# Patient Record
Sex: MALE | Race: WHITE | NOT HISPANIC OR LATINO | URBAN - METROPOLITAN AREA
[De-identification: names, ages, dates, MRNs, and addresses within clinical notes are randomized per-mention and may not be internally consistent; named-entity substitution may affect disease eponyms.]

---

## 2017-11-03 ENCOUNTER — EMERGENCY (EMERGENCY)
Facility: HOSPITAL | Age: 77
LOS: 1 days | Discharge: HOME | End: 2017-11-03
Attending: EMERGENCY MEDICINE | Admitting: EMERGENCY MEDICINE
Payer: MEDICARE

## 2017-11-03 VITALS
OXYGEN SATURATION: 97 % | HEIGHT: 70 IN | HEART RATE: 80 BPM | SYSTOLIC BLOOD PRESSURE: 118 MMHG | DIASTOLIC BLOOD PRESSURE: 69 MMHG | WEIGHT: 182.98 LBS | TEMPERATURE: 99 F | RESPIRATION RATE: 16 BRPM

## 2017-11-03 DIAGNOSIS — Z98.890 OTHER SPECIFIED POSTPROCEDURAL STATES: Chronic | ICD-10-CM

## 2017-11-03 LAB
ALBUMIN SERPL ELPH-MCNC: 3.7 G/DL — SIGNIFICANT CHANGE UP (ref 3.3–5)
ALP SERPL-CCNC: 55 U/L — SIGNIFICANT CHANGE UP (ref 40–120)
ALT FLD-CCNC: 16 U/L — SIGNIFICANT CHANGE UP (ref 12–78)
ANION GAP SERPL CALC-SCNC: 9 MMOL/L — SIGNIFICANT CHANGE UP (ref 5–17)
ANISOCYTOSIS BLD QL: SLIGHT — SIGNIFICANT CHANGE UP
AST SERPL-CCNC: 12 U/L — LOW (ref 15–37)
BILIRUB SERPL-MCNC: 1.1 MG/DL — SIGNIFICANT CHANGE UP (ref 0.2–1.2)
BUN SERPL-MCNC: 10 MG/DL — SIGNIFICANT CHANGE UP (ref 7–23)
CALCIUM SERPL-MCNC: 8.4 MG/DL — LOW (ref 8.5–10.1)
CHLORIDE SERPL-SCNC: 106 MMOL/L — SIGNIFICANT CHANGE UP (ref 96–108)
CK SERPL-CCNC: 165 U/L — SIGNIFICANT CHANGE UP (ref 26–308)
CO2 SERPL-SCNC: 26 MMOL/L — SIGNIFICANT CHANGE UP (ref 22–31)
CREAT SERPL-MCNC: 1.1 MG/DL — SIGNIFICANT CHANGE UP (ref 0.5–1.3)
ELLIPTOCYTES BLD QL SMEAR: SLIGHT — SIGNIFICANT CHANGE UP
GLUCOSE SERPL-MCNC: 127 MG/DL — HIGH (ref 70–99)
HCT VFR BLD CALC: 32 % — LOW (ref 39–50)
HGB BLD-MCNC: 10.8 G/DL — LOW (ref 13–17)
INR BLD: 1.19 RATIO — HIGH (ref 0.88–1.16)
LYMPHOCYTES # BLD AUTO: 31 % — SIGNIFICANT CHANGE UP (ref 13–44)
MACROCYTES BLD QL: SLIGHT — SIGNIFICANT CHANGE UP
MCHC RBC-ENTMCNC: 30.8 PG — SIGNIFICANT CHANGE UP (ref 27–34)
MCHC RBC-ENTMCNC: 33.7 GM/DL — SIGNIFICANT CHANGE UP (ref 32–36)
MCV RBC AUTO: 91.3 FL — SIGNIFICANT CHANGE UP (ref 80–100)
MONOCYTES NFR BLD AUTO: 22 % — HIGH (ref 1–9)
NEUTROPHILS NFR BLD AUTO: 43 % — SIGNIFICANT CHANGE UP (ref 43–77)
NEUTS BAND # BLD: 4 % — SIGNIFICANT CHANGE UP (ref 0–8)
PLAT MORPH BLD: NORMAL — SIGNIFICANT CHANGE UP
PLATELET # BLD AUTO: 185 K/UL — SIGNIFICANT CHANGE UP (ref 150–400)
POIKILOCYTOSIS BLD QL AUTO: SLIGHT — SIGNIFICANT CHANGE UP
POTASSIUM SERPL-MCNC: 4.1 MMOL/L — SIGNIFICANT CHANGE UP (ref 3.5–5.3)
POTASSIUM SERPL-SCNC: 4.1 MMOL/L — SIGNIFICANT CHANGE UP (ref 3.5–5.3)
PROT SERPL-MCNC: 7.7 G/DL — SIGNIFICANT CHANGE UP (ref 6–8.3)
PROTHROM AB SERPL-ACNC: 13 SEC — HIGH (ref 9.8–12.7)
RBC # BLD: 3.5 M/UL — LOW (ref 4.2–5.8)
RBC # FLD: 12.8 % — SIGNIFICANT CHANGE UP (ref 10.3–14.5)
RBC BLD AUTO: ABNORMAL
SODIUM SERPL-SCNC: 141 MMOL/L — SIGNIFICANT CHANGE UP (ref 135–145)
TROPONIN I SERPL-MCNC: <.015 NG/ML — SIGNIFICANT CHANGE UP (ref 0.01–0.04)
WBC # BLD: 4.1 K/UL — SIGNIFICANT CHANGE UP (ref 3.8–10.5)
WBC # FLD AUTO: 4.1 K/UL — SIGNIFICANT CHANGE UP (ref 3.8–10.5)

## 2017-11-03 PROCEDURE — 85027 COMPLETE CBC AUTOMATED: CPT

## 2017-11-03 PROCEDURE — 85610 PROTHROMBIN TIME: CPT

## 2017-11-03 PROCEDURE — 96360 HYDRATION IV INFUSION INIT: CPT

## 2017-11-03 PROCEDURE — 71045 X-RAY EXAM CHEST 1 VIEW: CPT

## 2017-11-03 PROCEDURE — 82550 ASSAY OF CK (CPK): CPT

## 2017-11-03 PROCEDURE — 71010: CPT | Mod: 26

## 2017-11-03 PROCEDURE — 84484 ASSAY OF TROPONIN QUANT: CPT

## 2017-11-03 PROCEDURE — 99284 EMERGENCY DEPT VISIT MOD MDM: CPT | Mod: 25

## 2017-11-03 PROCEDURE — 99285 EMERGENCY DEPT VISIT HI MDM: CPT

## 2017-11-03 PROCEDURE — 80053 COMPREHEN METABOLIC PANEL: CPT

## 2017-11-03 PROCEDURE — 36415 COLL VENOUS BLD VENIPUNCTURE: CPT

## 2017-11-03 RX ORDER — SODIUM CHLORIDE 9 MG/ML
1000 INJECTION INTRAMUSCULAR; INTRAVENOUS; SUBCUTANEOUS ONCE
Qty: 0 | Refills: 0 | Status: COMPLETED | OUTPATIENT
Start: 2017-11-03 | End: 2017-11-03

## 2017-11-03 RX ADMIN — SODIUM CHLORIDE 1000 MILLILITER(S): 9 INJECTION INTRAMUSCULAR; INTRAVENOUS; SUBCUTANEOUS at 23:24

## 2017-11-03 NOTE — ED PROVIDER NOTE - PROGRESS NOTE DETAILS
patient feeling well, no chest pain, no dizziness, feels back to normal, has appointment to see his cardiologist next week, understands to return to ER if having chest pain, shortness of breath or worsening of symptoms

## 2017-11-03 NOTE — ED ADULT NURSE NOTE - CHPI ED SYMPTOMS NEG
no nausea/no vomiting/no fever/no weakness/no decreased eating/drinking/no chills/no numbness/no pain/no tingling

## 2017-11-03 NOTE — ED PROVIDER NOTE - OBJECTIVE STATEMENT
77 male presents to ER with grandson states at 9pm he ate a big meal, "two giant hot dogs and macaroni salad", was watching television and vision became blurry, feeling light headed and dizziness, patient took his blood pressure and it was 75/40 and then asked his grandson to take him to the hospital. Patient states he was recently on vacation in [place] last month and while there he states he felt chest pain, diaphoresis and dizziness, went to the hospital and was told he had an NSTEMI, patient states he was admitted for 3 days and did not get a cardiac catheterization, was discharged home, patient [facility] Jackson and had a recent nuclear stress test but does not know the results. Patient currently feeling better, denies chest pain.

## 2017-11-03 NOTE — ED ADULT NURSE NOTE - OBJECTIVE STATEMENT
@[id number] Received and assumed care of pt at this time aaox3 in no obvious or acute distress. pt is a77 y/o m presents to ed walk in c/o dizziness , reports checking BP at home and was low. denies chest pain, visual disturbances, sob, diaphoresis, syncope, fever, chills, palpitations, weakness/numbness/tingling or any other distress. seen and evaluated by dr. cavazos. iv established with labs collected & sent, ekg completed, placed on continuous cardiac & pulse oximetry monitoring, dispo pending, will continue to monitor

## 2017-11-04 VITALS
OXYGEN SATURATION: 98 % | DIASTOLIC BLOOD PRESSURE: 63 MMHG | TEMPERATURE: 99 F | RESPIRATION RATE: 18 BRPM | HEART RATE: 76 BPM | SYSTOLIC BLOOD PRESSURE: 102 MMHG

## 2017-11-04 NOTE — ED ADULT NURSE REASSESSMENT NOTE - NS ED NURSE REASSESS COMMENT FT1
1st set cardiac enzymes negative, presently denies dizziness or any complaint, dispo pending, will continue to monitor.

## 2020-11-19 ENCOUNTER — INPATIENT (INPATIENT)
Facility: HOSPITAL | Age: 80
LOS: 0 days | Discharge: ANOTHER HOSPITAL | DRG: 281 | End: 2020-11-20
Attending: HOSPITALIST | Admitting: STUDENT IN AN ORGANIZED HEALTH CARE EDUCATION/TRAINING PROGRAM
Payer: MEDICARE

## 2020-11-19 VITALS
RESPIRATION RATE: 20 BRPM | DIASTOLIC BLOOD PRESSURE: 84 MMHG | HEART RATE: 101 BPM | SYSTOLIC BLOOD PRESSURE: 182 MMHG | HEIGHT: 70 IN | WEIGHT: 179.9 LBS | TEMPERATURE: 97 F | OXYGEN SATURATION: 98 %

## 2020-11-19 DIAGNOSIS — Z98.890 OTHER SPECIFIED POSTPROCEDURAL STATES: Chronic | ICD-10-CM

## 2020-11-19 DIAGNOSIS — N17.9 ACUTE KIDNEY FAILURE, UNSPECIFIED: ICD-10-CM

## 2020-11-19 DIAGNOSIS — I24.9 ACUTE ISCHEMIC HEART DISEASE, UNSPECIFIED: ICD-10-CM

## 2020-11-19 DIAGNOSIS — N40.0 BENIGN PROSTATIC HYPERPLASIA WITHOUT LOWER URINARY TRACT SYMPTOMS: ICD-10-CM

## 2020-11-19 DIAGNOSIS — I20.8 OTHER FORMS OF ANGINA PECTORIS: ICD-10-CM

## 2020-11-19 DIAGNOSIS — E11.9 TYPE 2 DIABETES MELLITUS WITHOUT COMPLICATIONS: ICD-10-CM

## 2020-11-19 DIAGNOSIS — E78.5 HYPERLIPIDEMIA, UNSPECIFIED: ICD-10-CM

## 2020-11-19 DIAGNOSIS — Z29.9 ENCOUNTER FOR PROPHYLACTIC MEASURES, UNSPECIFIED: ICD-10-CM

## 2020-11-19 DIAGNOSIS — I10 ESSENTIAL (PRIMARY) HYPERTENSION: ICD-10-CM

## 2020-11-19 LAB
ALBUMIN SERPL ELPH-MCNC: 3.9 G/DL — SIGNIFICANT CHANGE UP (ref 3.3–5)
ALP SERPL-CCNC: 59 U/L — SIGNIFICANT CHANGE UP (ref 40–120)
ALT FLD-CCNC: 28 U/L — SIGNIFICANT CHANGE UP (ref 12–78)
ANION GAP SERPL CALC-SCNC: 7 MMOL/L — SIGNIFICANT CHANGE UP (ref 5–17)
APPEARANCE UR: CLEAR — SIGNIFICANT CHANGE UP
AST SERPL-CCNC: 19 U/L — SIGNIFICANT CHANGE UP (ref 15–37)
BACTERIA # UR AUTO: ABNORMAL
BASOPHILS # BLD AUTO: 0 K/UL — SIGNIFICANT CHANGE UP (ref 0–0.2)
BASOPHILS NFR BLD AUTO: 0 % — SIGNIFICANT CHANGE UP (ref 0–2)
BILIRUB SERPL-MCNC: 0.7 MG/DL — SIGNIFICANT CHANGE UP (ref 0.2–1.2)
BILIRUB UR-MCNC: NEGATIVE — SIGNIFICANT CHANGE UP
BUN SERPL-MCNC: 27 MG/DL — HIGH (ref 7–23)
CALCIUM SERPL-MCNC: 8.9 MG/DL — SIGNIFICANT CHANGE UP (ref 8.5–10.1)
CHLORIDE SERPL-SCNC: 112 MMOL/L — HIGH (ref 96–108)
CO2 SERPL-SCNC: 26 MMOL/L — SIGNIFICANT CHANGE UP (ref 22–31)
COLOR SPEC: YELLOW — SIGNIFICANT CHANGE UP
CREAT SERPL-MCNC: 1.4 MG/DL — HIGH (ref 0.5–1.3)
DIFF PNL FLD: NEGATIVE — SIGNIFICANT CHANGE UP
EOSINOPHIL # BLD AUTO: 0 K/UL — SIGNIFICANT CHANGE UP (ref 0–0.5)
EOSINOPHIL NFR BLD AUTO: 0 % — SIGNIFICANT CHANGE UP (ref 0–6)
EPI CELLS # UR: SIGNIFICANT CHANGE UP
GLUCOSE SERPL-MCNC: 193 MG/DL — HIGH (ref 70–99)
GLUCOSE UR QL: 100 MG/DL
HCT VFR BLD CALC: 34.3 % — LOW (ref 39–50)
HGB BLD-MCNC: 11.6 G/DL — LOW (ref 13–17)
KETONES UR-MCNC: NEGATIVE — SIGNIFICANT CHANGE UP
LEUKOCYTE ESTERASE UR-ACNC: ABNORMAL
LIDOCAIN IGE QN: 286 U/L — SIGNIFICANT CHANGE UP (ref 73–393)
LYMPHOCYTES # BLD AUTO: 0.69 K/UL — LOW (ref 1–3.3)
LYMPHOCYTES # BLD AUTO: 16 % — SIGNIFICANT CHANGE UP (ref 13–44)
MANUAL SMEAR VERIFICATION: SIGNIFICANT CHANGE UP
MCHC RBC-ENTMCNC: 31.8 PG — SIGNIFICANT CHANGE UP (ref 27–34)
MCHC RBC-ENTMCNC: 33.8 GM/DL — SIGNIFICANT CHANGE UP (ref 32–36)
MCV RBC AUTO: 94 FL — SIGNIFICANT CHANGE UP (ref 80–100)
MONOCYTES # BLD AUTO: 1.07 K/UL — HIGH (ref 0–0.9)
MONOCYTES NFR BLD AUTO: 25 % — HIGH (ref 2–14)
NEUTROPHILS # BLD AUTO: 2.49 K/UL — SIGNIFICANT CHANGE UP (ref 1.8–7.4)
NEUTROPHILS NFR BLD AUTO: 56 % — SIGNIFICANT CHANGE UP (ref 43–77)
NEUTS BAND # BLD: 2 % — SIGNIFICANT CHANGE UP (ref 0–8)
NITRITE UR-MCNC: NEGATIVE — SIGNIFICANT CHANGE UP
NRBC # BLD: 0 — SIGNIFICANT CHANGE UP
NRBC # BLD: SIGNIFICANT CHANGE UP /100 WBCS (ref 0–0)
NT-PROBNP SERPL-SCNC: 330 PG/ML — SIGNIFICANT CHANGE UP (ref 0–450)
PH UR: 6.5 — SIGNIFICANT CHANGE UP (ref 5–8)
PLAT MORPH BLD: NORMAL — SIGNIFICANT CHANGE UP
PLATELET # BLD AUTO: 182 K/UL — SIGNIFICANT CHANGE UP (ref 150–400)
POTASSIUM SERPL-MCNC: 4.5 MMOL/L — SIGNIFICANT CHANGE UP (ref 3.5–5.3)
POTASSIUM SERPL-SCNC: 4.5 MMOL/L — SIGNIFICANT CHANGE UP (ref 3.5–5.3)
PROT SERPL-MCNC: 7.8 G/DL — SIGNIFICANT CHANGE UP (ref 6–8.3)
PROT UR-MCNC: 15
RBC # BLD: 3.65 M/UL — LOW (ref 4.2–5.8)
RBC # FLD: 14.1 % — SIGNIFICANT CHANGE UP (ref 10.3–14.5)
RBC BLD AUTO: SIGNIFICANT CHANGE UP
RBC CASTS # UR COMP ASSIST: SIGNIFICANT CHANGE UP /HPF (ref 0–4)
SODIUM SERPL-SCNC: 145 MMOL/L — SIGNIFICANT CHANGE UP (ref 135–145)
SP GR SPEC: 1.01 — SIGNIFICANT CHANGE UP (ref 1.01–1.02)
TROPONIN I SERPL-MCNC: <.015 NG/ML — SIGNIFICANT CHANGE UP (ref 0.01–0.04)
UROBILINOGEN FLD QL: NEGATIVE — SIGNIFICANT CHANGE UP
VARIANT LYMPHS # BLD: 1 % — SIGNIFICANT CHANGE UP (ref 0–6)
WBC # BLD: 4.29 K/UL — SIGNIFICANT CHANGE UP (ref 3.8–10.5)
WBC # FLD AUTO: 4.29 K/UL — SIGNIFICANT CHANGE UP (ref 3.8–10.5)
WBC UR QL: SIGNIFICANT CHANGE UP

## 2020-11-19 PROCEDURE — 99223 1ST HOSP IP/OBS HIGH 75: CPT | Mod: GC,AI

## 2020-11-19 PROCEDURE — 99291 CRITICAL CARE FIRST HOUR: CPT

## 2020-11-19 PROCEDURE — 71045 X-RAY EXAM CHEST 1 VIEW: CPT | Mod: 26

## 2020-11-19 PROCEDURE — 93010 ELECTROCARDIOGRAM REPORT: CPT

## 2020-11-19 RX ORDER — ASPIRIN/CALCIUM CARB/MAGNESIUM 324 MG
162 TABLET ORAL ONCE
Refills: 0 | Status: COMPLETED | OUTPATIENT
Start: 2020-11-19 | End: 2020-11-19

## 2020-11-19 RX ORDER — ENOXAPARIN SODIUM 100 MG/ML
80 INJECTION SUBCUTANEOUS ONCE
Refills: 0 | Status: COMPLETED | OUTPATIENT
Start: 2020-11-19 | End: 2020-11-19

## 2020-11-19 RX ORDER — ATORVASTATIN CALCIUM 80 MG/1
40 TABLET, FILM COATED ORAL AT BEDTIME
Refills: 0 | Status: DISCONTINUED | OUTPATIENT
Start: 2020-11-19 | End: 2020-11-20

## 2020-11-19 RX ORDER — ATORVASTATIN CALCIUM 80 MG/1
40 TABLET, FILM COATED ORAL DAILY
Refills: 0 | Status: DISCONTINUED | OUTPATIENT
Start: 2020-11-19 | End: 2020-11-19

## 2020-11-19 RX ORDER — ENOXAPARIN SODIUM 100 MG/ML
80 INJECTION SUBCUTANEOUS ONCE
Refills: 0 | Status: DISCONTINUED | OUTPATIENT
Start: 2020-11-19 | End: 2020-11-19

## 2020-11-19 RX ORDER — TICAGRELOR 90 MG/1
180 TABLET ORAL ONCE
Refills: 0 | Status: COMPLETED | OUTPATIENT
Start: 2020-11-19 | End: 2020-11-20

## 2020-11-19 RX ORDER — ENOXAPARIN SODIUM 100 MG/ML
85 INJECTION SUBCUTANEOUS ONCE
Refills: 0 | Status: DISCONTINUED | OUTPATIENT
Start: 2020-11-19 | End: 2020-11-19

## 2020-11-19 RX ORDER — TAMSULOSIN HYDROCHLORIDE 0.4 MG/1
0.8 CAPSULE ORAL AT BEDTIME
Refills: 0 | Status: DISCONTINUED | OUTPATIENT
Start: 2020-11-19 | End: 2020-11-20

## 2020-11-19 RX ORDER — METOPROLOL TARTRATE 50 MG
25 TABLET ORAL ONCE
Refills: 0 | Status: COMPLETED | OUTPATIENT
Start: 2020-11-19 | End: 2020-11-20

## 2020-11-19 RX ORDER — ISOSORBIDE MONONITRATE 60 MG/1
1 TABLET, EXTENDED RELEASE ORAL
Qty: 0 | Refills: 0 | DISCHARGE

## 2020-11-19 RX ORDER — FINASTERIDE 5 MG/1
5 TABLET, FILM COATED ORAL DAILY
Refills: 0 | Status: DISCONTINUED | OUTPATIENT
Start: 2020-11-19 | End: 2020-11-20

## 2020-11-19 RX ORDER — ASPIRIN/CALCIUM CARB/MAGNESIUM 324 MG
81 TABLET ORAL DAILY
Refills: 0 | Status: DISCONTINUED | OUTPATIENT
Start: 2020-11-20 | End: 2020-11-20

## 2020-11-19 RX ORDER — METOPROLOL TARTRATE 50 MG
50 TABLET ORAL DAILY
Refills: 0 | Status: DISCONTINUED | OUTPATIENT
Start: 2020-11-20 | End: 2020-11-20

## 2020-11-19 RX ORDER — FAMOTIDINE 10 MG/ML
20 INJECTION INTRAVENOUS ONCE
Refills: 0 | Status: COMPLETED | OUTPATIENT
Start: 2020-11-19 | End: 2020-11-19

## 2020-11-19 RX ORDER — METOPROLOL TARTRATE 50 MG
0 TABLET ORAL
Qty: 0 | Refills: 0 | DISCHARGE

## 2020-11-19 RX ORDER — RANOLAZINE 500 MG/1
500 TABLET, FILM COATED, EXTENDED RELEASE ORAL
Refills: 0 | Status: DISCONTINUED | OUTPATIENT
Start: 2020-11-19 | End: 2020-11-20

## 2020-11-19 RX ORDER — ISOSORBIDE MONONITRATE 60 MG/1
15 TABLET, EXTENDED RELEASE ORAL DAILY
Refills: 0 | Status: DISCONTINUED | OUTPATIENT
Start: 2020-11-19 | End: 2020-11-20

## 2020-11-19 RX ADMIN — FAMOTIDINE 20 MILLIGRAM(S): 10 INJECTION INTRAVENOUS at 21:49

## 2020-11-19 RX ADMIN — Medication 162 MILLIGRAM(S): at 21:50

## 2020-11-19 NOTE — H&P ADULT - NSHPSOCIALHISTORY_GEN_ALL_CORE
Denies ETOH or illicit drug use  Former smoke, quit 50yrs ago  Ambulates independently w/o assistive device

## 2020-11-19 NOTE — H&P ADULT - NSICDXPASTMEDICALHX_GEN_ALL_CORE_FT
PAST MEDICAL HISTORY:  Diabetes     Hyperlipemia     Hypertension     MI (myocardial infarction) 10/17/2017

## 2020-11-19 NOTE — H&P ADULT - ASSESSMENT
81yo M with PMH of HTN, HLD, DM2, NSTEMI (2017), and BPH presents to the ED for chest pressure, admitted for r/o ACS

## 2020-11-19 NOTE — ED PROVIDER NOTE - OBJECTIVE STATEMENT
Mid-sternal chest pressure c diaphoresis about 1 hour ago, but a lot better in the ED, just very "slight" c sob after coming home from grocery shopping. No nausea, vomiting .  [facility] [place]. HO NSTEMI on Dec 2017.  No prior episodes.  No other complaints.

## 2020-11-19 NOTE — H&P ADULT - NSHPREVIEWOFSYSTEMS_GEN_ALL_CORE
Constitutional: denies fever, chills, diaphoresis   HEENT: denies blurry vision, double vision, eye pain, difficulty hearing  Respiratory: denies SOB, cough, sputum production  Cardiovascular: denies CP, palpitations, edema. Positive for chest pressure  Gastrointestinal: denies nausea, vomiting, diarrhea, constipation, abdominal pain  Genitourinary: denies dysuria, frequency, urgency, hematuria   Skin/Breast: denies rash, itching  Neurologic: denies headache, weakness, dizziness, paresthesias, numbness/tingling

## 2020-11-19 NOTE — ED ADULT NURSE NOTE - CHPI ED NUR SYMPTOMS NEG
no shortness of breath/no nausea/no back pain/no congestion/no dizziness/no syncope/no chills/no vomiting/no diaphoresis/no fever

## 2020-11-19 NOTE — H&P ADULT - PROBLEM SELECTOR PLAN 1
Patient presenting w/ chest pressure, now resolved  -hx of NSTEMI in 2017  -Initial ECG showed 1mm ST depressions in leads V4-6 and aVF, repeat ECG showed 2mm ST depressions in leads V4-5  -Trops neg x1  -Currently HD stable  -s/p  in the ED  -continue ASA 81 and atorvastatin 40mg  -f/u repeat CE  -Cardio, Dr. Paz consulted Patient presenting w/ chest pressure, now resolved  -hx of NSTEMI in 2017  -Initial ECG showed 1mm ST depressions in leads V4-6 and aVF, repeat ECG showed 2mm ST depressions in leads V4-5  -Trops neg x1  -Currently HD stable  -s/p  in the ED  -continue ASA 81 and atorvastatin 40mg  -f/u repeat CE at 3AM  -f/u lipid panel  -Cardio, Dr. Paz consulted Patient presenting w/ chest pressure, now resolved  -hx of NSTEMI in 2017  -Initial ECG showed 1mm ST depressions in leads V4-6 and aVF, repeat ECG showed 2mm ST depressions in leads V4-5  -Trops neg x1  -Currently HD stable and without chest pain  -s/p  in the ED  -continue ASA 81 and atorvastatin 40mg  - s/p lovenox 80mg x 1.   -f/u repeat CE at 3AM, if enzymes remain neg will convert from full a/c to ppx lovenox.   -f/u lipid panel  -Cardio, Dr. Paz consulted

## 2020-11-19 NOTE — ED PROVIDER NOTE - CRITICAL CARE PROVIDED
direct patient care (not related to procedure)/documentation/interpretation of diagnostic studies/consultation with other physicians/additional history taking/conducted a detailed discussion of DNR status

## 2020-11-19 NOTE — ED ADULT NURSE NOTE - NSIMPLEMENTINTERV_GEN_ALL_ED
Implemented All Universal Safety Interventions:  [place] to call system. Call bell, personal items and telephone within reach. Instruct patient to call for assistance. Room bathroom lighting operational. Non-slip footwear when patient is off stretcher. Physically safe environment: no spills, clutter or unnecessary equipment. Stretcher in lowest position, wheels locked, appropriate side rails in place.

## 2020-11-19 NOTE — ED ADULT NURSE NOTE - OBJECTIVE STATEMENT
81yo male c/o chest pain/pressure X1 hour prior to ED arrival. pt indicates "the pain lasted 1 hour" as per pt. pt denies dizziness, blur vision and pain at this time. pt indicates " pressure was located to center of chest.

## 2020-11-19 NOTE — H&P ADULT - PROBLEM SELECTOR PLAN 2
Cr 1.4 on admission, unknown baseline  -suspect prerenal azotemia  -monitor renal indices  -avoid nephrotoxic meds

## 2020-11-19 NOTE — H&P ADULT - NSHPPHYSICALEXAM_GEN_ALL_CORE
T(C): 36 (11-19-20 @ 21:05), Max: 36 (11-19-20 @ 21:05)  HR: 84 (11-19-20 @ 21:51) (84 - 101)  BP: 161/72 (11-19-20 @ 21:51) (161/72 - 182/84)  RR: 15 (11-19-20 @ 21:51) (15 - 20)  SpO2: 100% (11-19-20 @ 21:51) (98% - 100%)    GENERAL: patient appears well, no acute distress, appropriate, pleasant  EYES: sclera clear, no exudates, EOMI  ENMT: oropharynx clear without erythema, no exudates, moist mucous membranes  LUNGS: good air entry bilaterally, clear to auscultation, symmetric breath sounds, no wheezing or rhonchi appreciated  HEART: soft S1/S2, regular rate and rhythm, no murmurs noted, no lower extremity edema  GASTROINTESTINAL: abdomen is soft, nontender, nondistended, normoactive bowel sounds, no palpable masses  INTEGUMENT: good skin turgor, no lesions noted  MUSCULOSKELETAL: no clubbing or cyanosis, no obvious deformity  NEUROLOGIC: awake, alert, oriented x3, good muscle tone in 4 extremities, no obvious sensory deficits

## 2020-11-19 NOTE — H&P ADULT - HISTORY OF PRESENT ILLNESS
79yo M with PMH of HTN, HLD, DM2, NSTEMI (2017), and BPH presents to the ED for chest pressure. Starting at 8pm today patient began experiencing sudden onset midsternal chest pressure and mild sweating. He notes lifting heavy groceries earlier in the afternoon and since then has felt fine until his onset of symptoms. Hx of NSTEMI in the past, patient states that sx are not similar in quality. He ate a large amount of food at around 2pm and thought the pain was due to GERD and took an antacid and took his home isosorbide nitrate x2. Pain did not subside so he decided to go to the ED. Patient currently feels well, notes relief of chest pressure. Denies ever having chest pain or radiation. Denies any HA, dizziness, diaphoresis, CP, palpitations, SOB, n/v, abdominal pain, or LE swelling.    In the ED  VS- 96.8F, 101HR, 182/84, 20RR, O2 sat 98% on RA  Labs showed trop neg x1, BNP normal, lipase WNL, Hb 11.6, Cr 1.4, glucose 193  Initial ECG showed 1mm ST depressions in leads V4-6 and aVF, repeat ECG showed 2mm ST depressions in leads V4-5  s/p  and famotidine 20mg IV 81yo M with PMH of HTN, HLD, DM2, NSTEMI (2017), and BPH presents to the ED for chest pressure. Starting at 8pm today patient began experiencing sudden onset midsternal chest pressure and mild sweating. He notes lifting heavy groceries earlier in the afternoon and since then has felt fine until his onset of symptoms. Hx of NSTEMI in the past, patient states that sx are not similar in quality. He ate a large amount of food at around 2pm and thought the pain was due to GERD and took an antacid and took his home isosorbide nitrate x2. Pain did not subside so he decided to go to the ED. Patient currently feels well, notes relief of chest pressure. Denies ever having chest pain or radiation. Denies any HA, dizziness, diaphoresis, CP, palpitations, SOB, n/v, abdominal pain, or LE swelling.    In the ED  VS- 97.8F, 101HR, 182/84, 20RR, O2 sat 98% on RA  Labs showed trop neg x1, BNP normal, lipase WNL, Hb 11.6, Cr 1.4, glucose 193  Initial ECG showed 1mm ST depressions in leads V4-6 and aVF, repeat ECG showed 2mm ST depressions in leads V4-5  s/p  and famotidine 20mg IV 81yo M with PMH of HTN, HLD, DM2 non insulin dependent, NSTEMI (2017), and BPH presents to the ED for chest pressure. Starting at 8pm today patient began experiencing sudden onset midsternal chest pressure and mild sweating. He notes lifting heavy groceries earlier in the afternoon and since then has felt fine until his onset of symptoms. Hx of NSTEMI in the past, patient states that sx are not similar in quality. He ate a large amount of food at around 2pm and thought the pain was due to GERD and took an antacid and took his home isosorbide nitrate x2. Pain did not subside so he decided to go to the ED. Patient currently feels well, notes relief of chest pressure. Denies ever having chest pain or radiation. Denies any HA, dizziness, diaphoresis, CP, palpitations, SOB, n/v, abdominal pain, or LE swelling.  of note pt exercises multiple times a week , both strength and cardio exercises . He never has symptoms of cp or sob with exertion.      In the ED  VS- 97.8F, 101HR, 182/84, 20RR, O2 sat 98% on RA  Labs showed trop neg x1, BNP normal, lipase WNL, Hb 11.6, Cr 1.4, glucose 193  Initial ECG showed 1mm ST depressions in leads V4-6 and aVF, repeat ECG showed 2mm ST depressions in leads V4-5  s/p  and famotidine 20mg IV

## 2020-11-20 ENCOUNTER — INPATIENT (INPATIENT)
Facility: HOSPITAL | Age: 80
LOS: 3 days | Discharge: HOME | DRG: 247 | End: 2020-11-24
Attending: HOSPITALIST | Admitting: INTERNAL MEDICINE
Payer: MEDICARE

## 2020-11-20 ENCOUNTER — TRANSCRIPTION ENCOUNTER (OUTPATIENT)
Age: 80
End: 2020-11-20

## 2020-11-20 VITALS
SYSTOLIC BLOOD PRESSURE: 156 MMHG | OXYGEN SATURATION: 97 % | HEART RATE: 73 BPM | TEMPERATURE: 98 F | DIASTOLIC BLOOD PRESSURE: 78 MMHG | HEIGHT: 70 IN | RESPIRATION RATE: 18 BRPM | WEIGHT: 179.9 LBS

## 2020-11-20 VITALS — SYSTOLIC BLOOD PRESSURE: 147 MMHG | HEART RATE: 66 BPM | DIASTOLIC BLOOD PRESSURE: 72 MMHG | RESPIRATION RATE: 18 BRPM

## 2020-11-20 DIAGNOSIS — I21.4 NON-ST ELEVATION (NSTEMI) MYOCARDIAL INFARCTION: ICD-10-CM

## 2020-11-20 DIAGNOSIS — E78.5 HYPERLIPIDEMIA, UNSPECIFIED: ICD-10-CM

## 2020-11-20 DIAGNOSIS — Z90.89 ACQUIRED ABSENCE OF OTHER ORGANS: Chronic | ICD-10-CM

## 2020-11-20 DIAGNOSIS — R07.9 CHEST PAIN, UNSPECIFIED: ICD-10-CM

## 2020-11-20 DIAGNOSIS — E11.9 TYPE 2 DIABETES MELLITUS WITHOUT COMPLICATIONS: ICD-10-CM

## 2020-11-20 DIAGNOSIS — N17.9 ACUTE KIDNEY FAILURE, UNSPECIFIED: ICD-10-CM

## 2020-11-20 DIAGNOSIS — Z98.890 OTHER SPECIFIED POSTPROCEDURAL STATES: Chronic | ICD-10-CM

## 2020-11-20 DIAGNOSIS — N40.0 BENIGN PROSTATIC HYPERPLASIA WITHOUT LOWER URINARY TRACT SYMPTOMS: ICD-10-CM

## 2020-11-20 DIAGNOSIS — I10 ESSENTIAL (PRIMARY) HYPERTENSION: ICD-10-CM

## 2020-11-20 DIAGNOSIS — Z29.9 ENCOUNTER FOR PROPHYLACTIC MEASURES, UNSPECIFIED: ICD-10-CM

## 2020-11-20 PROBLEM — I21.9 ACUTE MYOCARDIAL INFARCTION, UNSPECIFIED: Chronic | Status: ACTIVE | Noted: 2017-11-03

## 2020-11-20 LAB
A1C WITH ESTIMATED AVERAGE GLUCOSE RESULT: 5.7 % — HIGH (ref 4–5.6)
ANION GAP SERPL CALC-SCNC: 6 MMOL/L — SIGNIFICANT CHANGE UP (ref 5–17)
BUN SERPL-MCNC: 23 MG/DL — SIGNIFICANT CHANGE UP (ref 7–23)
CALCIUM SERPL-MCNC: 9.1 MG/DL — SIGNIFICANT CHANGE UP (ref 8.5–10.1)
CHLORIDE SERPL-SCNC: 110 MMOL/L — HIGH (ref 96–108)
CHOLEST SERPL-MCNC: 104 MG/DL — SIGNIFICANT CHANGE UP
CK MB BLD-MCNC: 6.8 % — HIGH (ref 0–3.5)
CK MB BLD-MCNC: 6.9 % — HIGH (ref 0–3.5)
CK MB CFR SERPL CALC: 12.7 NG/ML — HIGH (ref 0–3.6)
CK MB CFR SERPL CALC: 16.8 NG/ML — HIGH (ref 0–3.6)
CK SERPL-CCNC: 188 U/L — SIGNIFICANT CHANGE UP (ref 26–308)
CK SERPL-CCNC: 245 U/L — SIGNIFICANT CHANGE UP (ref 26–308)
CO2 SERPL-SCNC: 28 MMOL/L — SIGNIFICANT CHANGE UP (ref 22–31)
CREAT ?TM UR-MCNC: 74 MG/DL — SIGNIFICANT CHANGE UP
CREAT SERPL-MCNC: 1.2 MG/DL — SIGNIFICANT CHANGE UP (ref 0.5–1.3)
ESTIMATED AVERAGE GLUCOSE: 117 MG/DL — HIGH (ref 68–114)
GLUCOSE BLDC GLUCOMTR-MCNC: 114 MG/DL — HIGH (ref 70–99)
GLUCOSE BLDC GLUCOMTR-MCNC: 116 MG/DL — HIGH (ref 70–99)
GLUCOSE BLDC GLUCOMTR-MCNC: 145 MG/DL — HIGH (ref 70–99)
GLUCOSE SERPL-MCNC: 98 MG/DL — SIGNIFICANT CHANGE UP (ref 70–99)
HCT VFR BLD CALC: 34.1 % — LOW (ref 39–50)
HDLC SERPL-MCNC: 44 MG/DL — SIGNIFICANT CHANGE UP
HGB BLD-MCNC: 11.4 G/DL — LOW (ref 13–17)
LIPID PNL WITH DIRECT LDL SERPL: 47 MG/DL — SIGNIFICANT CHANGE UP
MCHC RBC-ENTMCNC: 31.4 PG — SIGNIFICANT CHANGE UP (ref 27–34)
MCHC RBC-ENTMCNC: 33.4 GM/DL — SIGNIFICANT CHANGE UP (ref 32–36)
MCV RBC AUTO: 93.9 FL — SIGNIFICANT CHANGE UP (ref 80–100)
NON HDL CHOLESTEROL: 60 MG/DL — SIGNIFICANT CHANGE UP
NRBC # BLD: 0 /100 WBCS — SIGNIFICANT CHANGE UP (ref 0–0)
PLATELET # BLD AUTO: 165 K/UL — SIGNIFICANT CHANGE UP (ref 150–400)
POTASSIUM SERPL-MCNC: 4.4 MMOL/L — SIGNIFICANT CHANGE UP (ref 3.5–5.3)
POTASSIUM SERPL-SCNC: 4.4 MMOL/L — SIGNIFICANT CHANGE UP (ref 3.5–5.3)
RBC # BLD: 3.63 M/UL — LOW (ref 4.2–5.8)
RBC # FLD: 13.9 % — SIGNIFICANT CHANGE UP (ref 10.3–14.5)
SARS-COV-2 RNA SPEC QL NAA+PROBE: SIGNIFICANT CHANGE UP
SODIUM SERPL-SCNC: 144 MMOL/L — SIGNIFICANT CHANGE UP (ref 135–145)
SODIUM UR-SCNC: 145 MMOL/L — SIGNIFICANT CHANGE UP
TRIGL SERPL-MCNC: 66 MG/DL — SIGNIFICANT CHANGE UP
TROPONIN I SERPL-MCNC: 1.61 NG/ML — HIGH (ref 0.01–0.04)
TROPONIN I SERPL-MCNC: 2.85 NG/ML — HIGH (ref 0.01–0.04)
WBC # BLD: 5.24 K/UL — SIGNIFICANT CHANGE UP (ref 3.8–10.5)
WBC # FLD AUTO: 5.24 K/UL — SIGNIFICANT CHANGE UP (ref 3.8–10.5)

## 2020-11-20 PROCEDURE — 93010 ELECTROCARDIOGRAM REPORT: CPT | Mod: 76

## 2020-11-20 PROCEDURE — 92941 PRQ TRLML REVSC TOT OCCL AMI: CPT | Mod: RC

## 2020-11-20 PROCEDURE — 99222 1ST HOSP IP/OBS MODERATE 55: CPT

## 2020-11-20 PROCEDURE — 99152 MOD SED SAME PHYS/QHP 5/>YRS: CPT

## 2020-11-20 PROCEDURE — 93010 ELECTROCARDIOGRAM REPORT: CPT

## 2020-11-20 PROCEDURE — 93458 L HRT ARTERY/VENTRICLE ANGIO: CPT | Mod: 26,59

## 2020-11-20 PROCEDURE — 99223 1ST HOSP IP/OBS HIGH 75: CPT

## 2020-11-20 RX ORDER — SODIUM CHLORIDE 9 MG/ML
1000 INJECTION, SOLUTION INTRAVENOUS
Refills: 0 | Status: DISCONTINUED | OUTPATIENT
Start: 2020-11-20 | End: 2020-11-20

## 2020-11-20 RX ORDER — TAMSULOSIN HYDROCHLORIDE 0.4 MG/1
0.8 CAPSULE ORAL AT BEDTIME
Refills: 0 | Status: DISCONTINUED | OUTPATIENT
Start: 2020-11-20 | End: 2020-11-24

## 2020-11-20 RX ORDER — METFORMIN HYDROCHLORIDE 850 MG/1
1 TABLET ORAL
Qty: 0 | Refills: 0 | DISCHARGE

## 2020-11-20 RX ORDER — RANOLAZINE 500 MG/1
500 TABLET, FILM COATED, EXTENDED RELEASE ORAL
Refills: 0 | Status: DISCONTINUED | OUTPATIENT
Start: 2020-11-20 | End: 2020-11-24

## 2020-11-20 RX ORDER — ASPIRIN/CALCIUM CARB/MAGNESIUM 324 MG
81 TABLET ORAL DAILY
Refills: 0 | Status: DISCONTINUED | OUTPATIENT
Start: 2020-11-20 | End: 2020-11-24

## 2020-11-20 RX ORDER — LANOLIN ALCOHOL/MO/W.PET/CERES
1 CREAM (GRAM) TOPICAL
Qty: 0 | Refills: 0 | DISCHARGE

## 2020-11-20 RX ORDER — INSULIN LISPRO 100/ML
VIAL (ML) SUBCUTANEOUS
Refills: 0 | Status: DISCONTINUED | OUTPATIENT
Start: 2020-11-20 | End: 2020-11-24

## 2020-11-20 RX ORDER — ATORVASTATIN CALCIUM 80 MG/1
1 TABLET, FILM COATED ORAL
Qty: 0 | Refills: 0 | DISCHARGE

## 2020-11-20 RX ORDER — INSULIN LISPRO 100/ML
1 VIAL (ML) SUBCUTANEOUS
Qty: 0 | Refills: 0 | DISCHARGE
Start: 2020-11-20

## 2020-11-20 RX ORDER — GLUCAGON INJECTION, SOLUTION 0.5 MG/.1ML
1 INJECTION, SOLUTION SUBCUTANEOUS ONCE
Refills: 0 | Status: DISCONTINUED | OUTPATIENT
Start: 2020-11-20 | End: 2020-11-20

## 2020-11-20 RX ORDER — DEXTROSE 50 % IN WATER 50 %
15 SYRINGE (ML) INTRAVENOUS ONCE
Refills: 0 | Status: DISCONTINUED | OUTPATIENT
Start: 2020-11-20 | End: 2020-11-24

## 2020-11-20 RX ORDER — SODIUM CHLORIDE 9 MG/ML
1000 INJECTION, SOLUTION INTRAVENOUS
Refills: 0 | Status: DISCONTINUED | OUTPATIENT
Start: 2020-11-20 | End: 2020-11-21

## 2020-11-20 RX ORDER — DEXTROSE 50 % IN WATER 50 %
12.5 SYRINGE (ML) INTRAVENOUS ONCE
Refills: 0 | Status: DISCONTINUED | OUTPATIENT
Start: 2020-11-20 | End: 2020-11-20

## 2020-11-20 RX ORDER — SODIUM CHLORIDE 9 MG/ML
3 INJECTION INTRAMUSCULAR; INTRAVENOUS; SUBCUTANEOUS EVERY 8 HOURS
Refills: 0 | Status: DISCONTINUED | OUTPATIENT
Start: 2020-11-20 | End: 2020-11-21

## 2020-11-20 RX ORDER — DEXTROSE 50 % IN WATER 50 %
12.5 SYRINGE (ML) INTRAVENOUS ONCE
Refills: 0 | Status: DISCONTINUED | OUTPATIENT
Start: 2020-11-20 | End: 2020-11-24

## 2020-11-20 RX ORDER — INSULIN LISPRO 100/ML
VIAL (ML) SUBCUTANEOUS AT BEDTIME
Refills: 0 | Status: DISCONTINUED | OUTPATIENT
Start: 2020-11-19 | End: 2020-11-20

## 2020-11-20 RX ORDER — RANOLAZINE 500 MG/1
1 TABLET, FILM COATED, EXTENDED RELEASE ORAL
Qty: 0 | Refills: 0 | DISCHARGE

## 2020-11-20 RX ORDER — DEXTROSE 50 % IN WATER 50 %
15 SYRINGE (ML) INTRAVENOUS ONCE
Refills: 0 | Status: DISCONTINUED | OUTPATIENT
Start: 2020-11-20 | End: 2020-11-20

## 2020-11-20 RX ORDER — DEXTROSE 50 % IN WATER 50 %
25 SYRINGE (ML) INTRAVENOUS ONCE
Refills: 0 | Status: DISCONTINUED | OUTPATIENT
Start: 2020-11-20 | End: 2020-11-20

## 2020-11-20 RX ORDER — SODIUM CHLORIDE 9 MG/ML
1000 INJECTION INTRAMUSCULAR; INTRAVENOUS; SUBCUTANEOUS
Refills: 0 | Status: DISCONTINUED | OUTPATIENT
Start: 2020-11-20 | End: 2020-11-21

## 2020-11-20 RX ORDER — TAMSULOSIN HYDROCHLORIDE 0.4 MG/1
2 CAPSULE ORAL
Qty: 0 | Refills: 0 | DISCHARGE

## 2020-11-20 RX ORDER — ISOSORBIDE MONONITRATE 60 MG/1
0.5 TABLET, EXTENDED RELEASE ORAL
Qty: 0 | Refills: 0 | DISCHARGE

## 2020-11-20 RX ORDER — ENOXAPARIN SODIUM 100 MG/ML
80 INJECTION SUBCUTANEOUS
Refills: 0 | Status: DISCONTINUED | OUTPATIENT
Start: 2020-11-20 | End: 2020-11-20

## 2020-11-20 RX ORDER — DEXTROSE 50 % IN WATER 50 %
25 SYRINGE (ML) INTRAVENOUS ONCE
Refills: 0 | Status: DISCONTINUED | OUTPATIENT
Start: 2020-11-20 | End: 2020-11-24

## 2020-11-20 RX ORDER — INSULIN LISPRO 100/ML
0 VIAL (ML) SUBCUTANEOUS
Qty: 0 | Refills: 0 | DISCHARGE
Start: 2020-11-20

## 2020-11-20 RX ORDER — LANOLIN ALCOHOL/MO/W.PET/CERES
5 CREAM (GRAM) TOPICAL AT BEDTIME
Refills: 0 | Status: DISCONTINUED | OUTPATIENT
Start: 2020-11-20 | End: 2020-11-24

## 2020-11-20 RX ORDER — INSULIN LISPRO 100/ML
VIAL (ML) SUBCUTANEOUS AT BEDTIME
Refills: 0 | Status: DISCONTINUED | OUTPATIENT
Start: 2020-11-20 | End: 2020-11-24

## 2020-11-20 RX ORDER — INSULIN LISPRO 100/ML
VIAL (ML) SUBCUTANEOUS
Refills: 0 | Status: DISCONTINUED | OUTPATIENT
Start: 2020-11-19 | End: 2020-11-20

## 2020-11-20 RX ORDER — FINASTERIDE 5 MG/1
5 TABLET, FILM COATED ORAL DAILY
Refills: 0 | Status: DISCONTINUED | OUTPATIENT
Start: 2020-11-20 | End: 2020-11-24

## 2020-11-20 RX ORDER — FINASTERIDE 5 MG/1
1 TABLET, FILM COATED ORAL
Qty: 0 | Refills: 0 | DISCHARGE

## 2020-11-20 RX ORDER — METOPROLOL TARTRATE 50 MG
1 TABLET ORAL
Qty: 0 | Refills: 0 | DISCHARGE

## 2020-11-20 RX ORDER — TICAGRELOR 90 MG/1
180 TABLET ORAL ONCE
Refills: 0 | Status: COMPLETED | OUTPATIENT
Start: 2020-11-20 | End: 2020-11-20

## 2020-11-20 RX ORDER — ISOSORBIDE MONONITRATE 60 MG/1
15 TABLET, EXTENDED RELEASE ORAL DAILY
Refills: 0 | Status: DISCONTINUED | OUTPATIENT
Start: 2020-11-20 | End: 2020-11-24

## 2020-11-20 RX ORDER — HEPARIN SODIUM 5000 [USP'U]/ML
5000 INJECTION INTRAVENOUS; SUBCUTANEOUS EVERY 12 HOURS
Refills: 0 | Status: DISCONTINUED | OUTPATIENT
Start: 2020-11-19 | End: 2020-11-20

## 2020-11-20 RX ORDER — METOPROLOL TARTRATE 50 MG
50 TABLET ORAL DAILY
Refills: 0 | Status: DISCONTINUED | OUTPATIENT
Start: 2020-11-20 | End: 2020-11-24

## 2020-11-20 RX ORDER — ATORVASTATIN CALCIUM 80 MG/1
40 TABLET, FILM COATED ORAL AT BEDTIME
Refills: 0 | Status: DISCONTINUED | OUTPATIENT
Start: 2020-11-20 | End: 2020-11-24

## 2020-11-20 RX ORDER — GLUCAGON INJECTION, SOLUTION 0.5 MG/.1ML
1 INJECTION, SOLUTION SUBCUTANEOUS ONCE
Refills: 0 | Status: DISCONTINUED | OUTPATIENT
Start: 2020-11-20 | End: 2020-11-21

## 2020-11-20 RX ORDER — TICAGRELOR 90 MG/1
90 TABLET ORAL EVERY 12 HOURS
Refills: 0 | Status: DISCONTINUED | OUTPATIENT
Start: 2020-11-21 | End: 2020-11-24

## 2020-11-20 RX ADMIN — SODIUM CHLORIDE 3 MILLILITER(S): 9 INJECTION INTRAMUSCULAR; INTRAVENOUS; SUBCUTANEOUS at 21:23

## 2020-11-20 RX ADMIN — ISOSORBIDE MONONITRATE 15 MILLIGRAM(S): 60 TABLET, EXTENDED RELEASE ORAL at 17:29

## 2020-11-20 RX ADMIN — ENOXAPARIN SODIUM 80 MILLIGRAM(S): 100 INJECTION SUBCUTANEOUS at 00:08

## 2020-11-20 RX ADMIN — Medication 25 MILLIGRAM(S): at 09:45

## 2020-11-20 RX ADMIN — TICAGRELOR 180 MILLIGRAM(S): 90 TABLET ORAL at 00:08

## 2020-11-20 RX ADMIN — ATORVASTATIN CALCIUM 40 MILLIGRAM(S): 80 TABLET, FILM COATED ORAL at 00:08

## 2020-11-20 RX ADMIN — Medication 81 MILLIGRAM(S): at 17:29

## 2020-11-20 RX ADMIN — ATORVASTATIN CALCIUM 40 MILLIGRAM(S): 80 TABLET, FILM COATED ORAL at 21:24

## 2020-11-20 RX ADMIN — Medication 50 MILLIGRAM(S): at 05:29

## 2020-11-20 RX ADMIN — RANOLAZINE 500 MILLIGRAM(S): 500 TABLET, FILM COATED, EXTENDED RELEASE ORAL at 05:29

## 2020-11-20 RX ADMIN — RANOLAZINE 500 MILLIGRAM(S): 500 TABLET, FILM COATED, EXTENDED RELEASE ORAL at 17:29

## 2020-11-20 RX ADMIN — SODIUM CHLORIDE 75 MILLILITER(S): 9 INJECTION INTRAMUSCULAR; INTRAVENOUS; SUBCUTANEOUS at 17:29

## 2020-11-20 RX ADMIN — TICAGRELOR 180 MILLIGRAM(S): 90 TABLET ORAL at 17:29

## 2020-11-20 RX ADMIN — SODIUM CHLORIDE 3 MILLILITER(S): 9 INJECTION INTRAMUSCULAR; INTRAVENOUS; SUBCUTANEOUS at 14:19

## 2020-11-20 RX ADMIN — TAMSULOSIN HYDROCHLORIDE 0.8 MILLIGRAM(S): 0.4 CAPSULE ORAL at 21:24

## 2020-11-20 RX ADMIN — FINASTERIDE 5 MILLIGRAM(S): 5 TABLET, FILM COATED ORAL at 17:29

## 2020-11-20 RX ADMIN — Medication 0: at 08:35

## 2020-11-20 RX ADMIN — SODIUM CHLORIDE 75 MILLILITER(S): 9 INJECTION INTRAMUSCULAR; INTRAVENOUS; SUBCUTANEOUS at 14:19

## 2020-11-20 RX ADMIN — TAMSULOSIN HYDROCHLORIDE 0.8 MILLIGRAM(S): 0.4 CAPSULE ORAL at 00:08

## 2020-11-20 NOTE — H&P ADULT - PROBLEM SELECTOR PLAN 6
Continue Atorvastatin 40mg po qd LDL 47  Continue Atorvastatin 40mg po qd -LDL 47  -Continue Atorvastatin 40mg po qd

## 2020-11-20 NOTE — DISCHARGE NOTE PROVIDER - CARE PROVIDER_API CALL
Ramos Quintero  INTERNAL MEDICINE  [address] [place] [id number]  Phone: [phone]  Fax: [phone]  Follow Up Time:

## 2020-11-20 NOTE — H&P ADULT - NSICDXPASTMEDICALHX_GEN_ALL_CORE_FT
PAST MEDICAL HISTORY:  BPH (benign prostatic hyperplasia)     Diabetes     Hyperlipemia     Hypertension     MI (myocardial infarction) 10/17/2017    NOEL treated with BiPAP

## 2020-11-20 NOTE — H&P CARDIOLOGY - PSH
H/O left inguinal hernia repair  LIH repair x3  History of nasal septoplasty    History of tonsillectomy

## 2020-11-20 NOTE — H&P CARDIOLOGY - PMH
BPH (benign prostatic hyperplasia)    Diabetes    Hyperlipemia    Hypertension    MI (myocardial infarction)  10/17/2017  NOEL treated with BiPAP

## 2020-11-20 NOTE — H&P ADULT - NSHPPHYSICALEXAM_GEN_ALL_CORE
Vital Signs Last 24 Hrs  T(C): 37.1 (20 Nov 2020 16:15), Max: 37.1 (20 Nov 2020 16:15)  T(F): 98.8 (20 Nov 2020 16:15), Max: 98.8 (20 Nov 2020 16:15)  HR: 70 (20 Nov 2020 18:15) (63 - 101)  BP: 136/67 (20 Nov 2020 18:15) (113/89 - 182/84)  BP(mean): 100 (20 Nov 2020 11:56) (100 - 100)  RR: 18 (20 Nov 2020 18:15) (15 - 20)  SpO2: 98% (20 Nov 2020 18:15) (97% - 100%)

## 2020-11-20 NOTE — H&P ADULT - NSICDXFAMILYHX_GEN_ALL_CORE_FT
FAMILY HISTORY:  Mother  Still living? No  Family history of lymphoma, Age at diagnosis: Age Unknown

## 2020-11-20 NOTE — CONSULT NOTE ADULT - ASSESSMENT
81yo M with PMH of HTN, HLD, DM2 non insulin dependent, NSTEMI (2017), and BPH presents to the ED for chest pressure. r/o ACS    #R/o ACS  - 79yo M with PMH of HTN, HLD, DM2 non insulin dependent, NSTEMI (2017), and BPH presents to the ED for chest pressure. r/o ACS    #R/o ACS  - EKG findings suggesting NSTEMI with positive troponins   - Transfer pt to [facility] for cardiac catheterization      79yo M with PMH of HTN, HLD, DM2 non insulin dependent, NSTEMI (2017), and BPH presents to the ED for chest pressure. r/o ACS    #R/o ACS  - EKG findings suggesting NSTEMI with positive troponins   - Transfer pt to NS for cardiac catheterization, spoke with Dr. Knight who will accept patient for cath  -   - Hold Lovenox  - Continue Brilinta   - No meaningful evidence of volume overload.  - BP stable, monitor routine hemodynamics.  - Continue BB  - Monitor and replete lytes, keep K>4, Mg>2.  - All other workup per primary team.  - Will continue to follow.

## 2020-11-20 NOTE — H&P CARDIOLOGY - HISTORY OF PRESENT ILLNESS
81yo male with no implantable device, with PMHx of HTN, HLD, DM2, NSTEMI (2017, [facility] [place] [facility]), NOEL on BIPAP, and BPH presents to the [facility] ED on 11/19 pm for chest pressure. pt reports he had 7-8/10 mid sternal chest pressure after carrying heavy grocery bags. Pressure had lasted about an hour did not relieved with NTG SL x2. Patient states that sx are not similar in quality to prior NSTEMI sx. Troponin 2.78<-1.6, now transferred here for C.   Patient currently feels well, denies chest pain, SOB, HA, dizziness, diaphoresis, or palpitations.  Initial ECG showed 1mm ST depressions in leads V4-6 and aVF, repeat ECG showed 2mm ST depressions in leads V4-5  s/p  and famotidine 20mg IV given.      81 yo male with no implantable device, with PMHx of HTN, HLD, DM2 (A1C 5.7 on this admission), NSTEMI (2017, [facility] [place] [facility]), NOEL on BIPAP, and BPH presents to the [facility] ED on 11/19 pm for chest pressure. pt reports he had 7-8/10 mid sternal chest pressure after carrying heavy grocery bags. Pressure had lasted about an hour did not relieved with NTG SL x2. Patient states that sx are not similar in quality to prior NSTEMI sx. Troponin 2.85<-1.61, BUN/Cr 27/1.4<-23/1.2, now transferred here for C.   Patient currently feels well, denies chest pain, SOB, HA, dizziness, diaphoresis, or palpitations.    Initial ECG showed 1mm ST depressions in leads V4-6 and aVF, repeat ECG showed 2mm ST depressions in leads V4-5  s/p  and famotidine 20mg IV given.

## 2020-11-20 NOTE — DISCHARGE NOTE PROVIDER - NSDCMRMEDTOKEN_GEN_ALL_CORE_FT
Aspirin Low Dose 81 mg oral delayed release tablet: 1 tab(s) orally once a day  atorvastatin 40 mg oral tablet: 1 tab(s) orally once a day  finasteride 5 mg oral tablet: 1 tab(s) orally once a day  insulin lispro 100 units/mL injectable solution: per sliding scale  insulin lispro 100 units/mL injectable solution:  injectable   isosorbide mononitrate 30 mg oral tablet, extended release: 0.5 tab(s) orally once a day (in the morning)  melatonin 10 mg oral capsule: 1 cap(s) orally once a day (at bedtime)  metoprolol succinate 50 mg oral tablet, extended release: 1 tab(s) orally once a day  ranolazine 500 mg oral tablet, extended release: 1 tab(s) orally 2 times a day  tamsulosin 0.4 mg oral capsule: 2 tab(s) orally once a day

## 2020-11-20 NOTE — CHART NOTE - NSCHARTNOTEFT_GEN_A_CORE
Patient underwent a PCI procedure and is being admitted as they are at increased risk for major adverse cardiac and vascular events if discharged due to the following high risk characteristics: NSTEMI, multi vessel disease    Pre-procedure Clinical Criteria

## 2020-11-20 NOTE — DISCHARGE NOTE PROVIDER - NSDCCPCAREPLAN_GEN_ALL_CORE_FT
PRINCIPAL DISCHARGE DIAGNOSIS  Diagnosis: ACS (acute coronary syndrome)  Assessment and Plan of Treatment: Transfer to [facility] for catheterization.

## 2020-11-20 NOTE — H&P ADULT - PROBLEM SELECTOR PLAN 2
Cr 1.4>1.2  Likely pre renal etiology  UA protein 15    Monitor BMP  Check Urine Cr, urea, na Cr 1.4>1.2  Likely pre renal etiology  UA protein 15    -Monitor BMP  -Check Urine Cr, urea, na  -Bladder scan to check for retention

## 2020-11-20 NOTE — H&P ADULT - PROBLEM SELECTOR PLAN 5
At home patient takes Metformin 500mg po bid - hold while inpatient  A1C 5.7  Continue ISS  Monitor fingerstick glu checks ACHS At home patient takes Metformin 500mg po bid - hold while inpatient  -A1C 5.7  -Continue ISS  -Monitor fingerstick glu checks ACHS

## 2020-11-20 NOTE — DISCHARGE NOTE PROVIDER - HOSPITAL COURSE
ADMISSION HPI:  79yo M with PMH of HTN, HLD, DM2 non insulin dependent, NSTEMI (2017), and BPH presents to the ED for chest pressure. Starting at 8pm today patient began experiencing sudden onset midsternal chest pressure and mild sweating. He notes lifting heavy groceries earlier in the afternoon and since then has felt fine until his onset of symptoms. Hx of NSTEMI in the past, patient states that sx are not similar in quality. He ate a large amount of food at around 2pm and thought the pain was due to GERD and took an antacid and took his home isosorbide nitrate x2. Pain did not subside so he decided to go to the ED. Patient currently feels well, notes relief of chest pressure. Denies ever having chest pain or radiation. Denies any HA, dizziness, diaphoresis, CP, palpitations, SOB, n/v, abdominal pain, or LE swelling.  of note pt exercises multiple times a week , both strength and cardio exercises . He never has symptoms of cp or sob with exertion.      In the ED  VS- 97.8F, 101HR, 182/84, 20RR, O2 sat 98% on RA  Labs showed trop neg x1, BNP normal, lipase WNL, Hb 11.6, Cr 1.4, glucose 193  Initial ECG showed 1mm ST depressions in leads V4-6 and aVF, repeat ECG showed 2mm ST depressions in leads V4-5  s/p  and famotidine 20mg IV (19 Nov 2020 23:15)    HOSPITAL COURSE:  Treated with aspirin, lovenox, statin. Seen by cardio. Recommending transfer for catheterization for NSTEMI. Patient accepted for transfer by Dr. Knight.     Seen pro  VITAL SIGNS:  Vital Signs Last 24 Hrs  T(C): 36.4 (11-20-20 @ 06:34), Max: 36.4 (11-20-20 @ 06:34)  T(F): 97.6 (11-20-20 @ 06:34), Max: 97.6 (11-20-20 @ 06:34)  HR: 66 (11-20-20 @ 09:46) (66 - 101)  BP: 147/72 (11-20-20 @ 09:46) (135/79 - 182/84)  BP(mean): --  RR: 18 (11-20-20 @ 09:46) (15 - 20)  SpO2: 99% (11-20-20 @ 06:34) (98% - 100%)      PHYSICAL EXAM:     GENERAL: no acute distress  HEENT: NC/AT, EOMI, neck supple, MMM  RESPIRATORY: LCTAB/L, no rhonchi, rales, or wheezing  CARDIOVASCULAR: RRR, no murmurs, gallops, rubs  ABDOMINAL: soft, non-tender, non-distended, positive bowel sounds   EXTREMITIES: no clubbing, cyanosis, or edema  NEUROLOGICAL: alert and oriented x 3, non-focal  SKIN: warm, dry, intact  MUSCULOSKELETAL: no gross joint deformity    Patient stable for transfer for catheterization at [facility].     Time spent on discharge planning was 32 minutes

## 2020-11-20 NOTE — H&P ADULT - NSICDXPASTSURGICALHX_GEN_ALL_CORE_FT
PAST SURGICAL HISTORY:  H/O left inguinal hernia repair LIH repair x3    History of nasal septoplasty     History of tonsillectomy

## 2020-11-20 NOTE — H&P ADULT - NSHPSOCIALHISTORY_GEN_ALL_CORE
Patient lives at home with son and grandchild  Independent, sometimes uses cane for ambulation  Quit smoking 50 years ago  Denies alcohol/illicit substance use

## 2020-11-20 NOTE — H&P ADULT - HISTORY OF PRESENT ILLNESS
Patient is a 80 year old male with significant past medical history of hypertension, hyperlipidemia, diabetes mellitus type 2, NSTEMI (2017, [facility] [place] [facility]), NOEL on BIPAP, BPH, initially presented to [facility] with complaint of chest pain; transfered to [facility] for further management. S/P LHC, [place] x1 RCA. Patient is admitted for further management and plan for staged PCI for LCx 11/23. Patient is a 80 year old male with significant past medical history of hypertension, hyperlipidemia, diabetes mellitus type 2, NSTEMI (2017, [facility] [place] no PCI), NOEL on BIPAP, BPH, initially presented to [facility] with complaint of chest pain; transfered to [facility] for further management. S/P LHC, [place] x1 RCA.  plan for staged PCI for LCx 11/23. Patient is a 80 year old male with significant past medical history of hypertension, hyperlipidemia, diabetes mellitus type 2, NSTEMI (2017, [facility] [place] no PCI), NOEL on BIPAP, BPH, initially presented to [facility] with complaint of chest pain; transferred to [facility] for further management. S/P LHC, [place] x1 RCA.  Per pervious notes in chart, patient reportedly started experiencing mid sternal chest pain after carrying grocery bags, not relieved with NTG SL x2. At [facility], Initial ECG showed 1mm ST depressions in leads V4-6 and aVF, repeat ECG showed 2mm ST depressions in leads V4-5, received , famotidine, Troponin 2.85<-1.61, BUN/Cr 27/1.4<-23/1.2, transferred for LHC s/p DESx1 RCA and plan for staged PCI for LCx 11/23. Currently, patient is resting comfortably in bed, with no complaint. Reports that chest pain is completely resolved and he had a full meal without any issue. Denies headache, dizziness, fever, chills, vision changes, cough, sob, sore throat, dysphagia N/V, chest pressure/pain, palpitations, abdominal pain, dysuria, hematuria, diarrhea. Reports some mild discomfort at RRA site, and mild chronic intermittent numbness/tingling in fingers.

## 2020-11-20 NOTE — CONSULT NOTE ADULT - ATTENDING COMMENTS
I saw and examined the patient personally. Spoke with above provider regarding this case. I reviewed the above findings completely.  I agree with the above history, physical, and plan which I have edited where appropriate.     He had typical anginal symtpoms with ST depressions with pain that resolved when became pain free. Given that and + trops, needs a cardiac cath. cont dapt and statin. hold lovenox this am. cont BB, He has accepted pt. Risk (including but not limited to periprocedureal MI and death), benefits, alternatives fully explained to the patient. The patient agrees to the procedure. Spoke with Dr. Knight in regards to cath urgently today.

## 2020-11-20 NOTE — H&P ADULT - NSHPOUTPATIENTPROVIDERS_GEN_ALL_CORE
PCP Dr. Graham [facility])  [facility] (patient unable to recall name), per previous note: Dr. Shanna Mata

## 2020-11-20 NOTE — CHART NOTE - NSCHARTNOTEFT_GEN_A_CORE
79 yo male with no implantable device, with PMHx of HTN, HLD, DM2 (A1C 5.7 on this admission), NSTEMI (2017, [facility] [place] [facility]), NOEL on BIPAP, and BPH presents to the [facility] ED on 11/19 pm for chest pressure. pt reports he had 7-8/10 mid sternal chest pressure after carrying heavy grocery bags. Pressure had lasted about an hour did not relieved with NTG SL x2. Patient states that sx are not similar in quality to prior NSTEMI sx. Troponin 2.85<-1.61, BUN/Cr 27/1.4<-23/1.2, now transferred here for C.   Patient currently feels well, denies chest pain, SOB, HA, dizziness, diaphoresis, or palpitations.  C mid RCA 90% s/p FLORENCE x1, for stage PCI on Monday for RCA 90% lesion.   RRA site band stable, ACT>35.     Continue to monitor,   Continue ASA, Brilinta, Metoprolol, Statin  RRA band removal due at 19:30,   Stage PCI on Monday in LCx  card consult team to f/u on Sunday 81 yo male with no implantable device, with PMHx of HTN, HLD, DM2 (A1C 5.7 on this admission), NSTEMI (2017, [facility] [place] [facility]), NOEL on BIPAP, and BPH presents to the [facility] ED on 11/19 pm for chest pressure. pt reports he had 7-8/10 mid sternal chest pressure after carrying heavy grocery bags. Pressure had lasted about an hour did not relieved with NTG SL x2. Patient states that sx are not similar in quality to prior NSTEMI sx. Troponin 2.85<-1.61, BUN/Cr 27/1.4<-23/1.2, now transferred here for C.   Patient currently feels well, denies chest pain, SOB, HA, dizziness, diaphoresis, or palpitations.  LHC mid RCA 90% s/p FLORENCE x1, for stage PCI on Monday for RCA 90% lesion.   RRA site band stable, ACT>35.     Continue to monitor,   Continue ASA, Brilinta, Metoprolol, Statin  RRA band removal due at 19:30,   Stage PCI on Monday in LCx  Monitor BUN/Cr   card consult team to f/u on Sunday

## 2020-11-20 NOTE — H&P ADULT - PROBLEM SELECTOR PLAN 4
Patient complaining of incomplete bladder emptying and frequent urination.   UA: trace leuk esterase, few bacteria  Continue home medications Tamsulosin and Finasteride  Bladder scan to check for urinary retention  Check urine culture Patient complaining of incomplete bladder emptying and frequent urination.   UA: trace leuk esterase, few bacteria  -Continue home medications Tamsulosin and Finasteride  -Bladder scan to check for urinary retention  Check urine culture

## 2020-11-20 NOTE — H&P ADULT - PROBLEM SELECTOR PLAN 1
#History of NSTEMI  #ACS    Presented at [facility] with chest pain 11/19, transferred for [facility]  Troponin 2.85<1.61  S/P C mid RCA 90% s/p FLORENCE x1; Cx mid 90%  At the time of exam: VSS, no complaint/symptom. PE benign    Plan  -Continue to monitor on telemetry  -Continue metoprolol succinate 50mg po qd  -Continue ASA 81mg po qd, Brilinta 90mg po bid  -Continue home medications Imdur, Ranolazine  -F/U cardiology for further recommendations  -RRA care per cardiology team  -Plan for staged PCI for Cx mid 90% on 11/23 per cardiology team #History of NSTEMI, angina  #ACS    Presented at [facility] with chest pain 11/19, transferred for [facility]  Troponin 2.85<1.61  S/P [facility] mid RCA 90% s/p FLORENCE x1; Cx mid 90%  At the time of exam: VSS, no complaint/symptom. PE benign    Cardiology notes reviewed.    Plan  -Continue to monitor on telemetry  -Continue metoprolol succinate 50mg po qd  -Continue ASA 81mg po qd, Brilinta 90mg po bid  -Continue home medications Imdur, Ranolazine  -Monitor BMP, electrolytes  -F/U cardiology for further recommendations  -RRA care per cardiology team  -Plan for staged PCI for Cx mid 90% on 11/23 per cardiology team

## 2020-11-20 NOTE — H&P ADULT - ASSESSMENT
Patient is a 80 year old male with significant past medical history of hypertension, hyperlipidemia, diabetes mellitus type 2, NSTEMI (2017, [facility] [place] [facility]), NOEL on BIPAP, BPH, initially presented to [facility] with complaint of chest pain; transfered to [facility] for further management. S/P LHC, FLORENCE x1 RCA. Patient is admitted for further management and plan for staged PCI for LCx 11/23.

## 2020-11-20 NOTE — CONSULT NOTE ADULT - SUBJECTIVE AND OBJECTIVE BOX
Patient is a 80y old  Male who presents with a chief complaint of r/o ACS (2020 23:15)    Charting in progress    HPI:  79yo M with PMH of HTN, HLD, DM2 non insulin dependent, NSTEMI (2017), and BPH presents to the ED for chest pressure. Starting at 8pm today patient began experiencing sudden onset midsternal chest pressure and mild sweating. He notes lifting heavy groceries earlier in the afternoon and since then has felt fine until his onset of symptoms. Hx of NSTEMI in the past, patient states that sx are not similar in quality. He ate a large amount of food at around 2pm and thought the pain was due to GERD and took an antacid and took his home isosorbide nitrate x2. Pain did not subside so he decided to go to the ED. Patient currently feels well, notes relief of chest pressure. Denies ever having chest pain or radiation. Denies any HA, dizziness, diaphoresis, CP, palpitations, SOB, n/v, abdominal pain, or LE swelling.  of note pt exercises multiple times a week , both strength and cardio exercises . He never has symptoms of cp or sob with exertion.      In the ED  VS- 97.8F, 101HR, 182/84, 20RR, O2 sat 98% on RA  Labs showed trop neg x1, BNP normal, lipase WNL, Hb 11.6, Cr 1.4, glucose 193  Initial ECG showed 1mm ST depressions in leads V4-6 and aVF, repeat ECG showed 2mm ST depressions in leads V4-5  s/p  and famotidine 20mg IV (2020 23:15)      PAST MEDICAL & SURGICAL HISTORY:  MI (myocardial infarction)  10/17/2017    Diabetes    Hypertension    Hyperlipemia    H/O left inguinal hernia repair  LIH repair x3              ECHO  FINDINGS:      MEDICATIONS  (STANDING):  aspirin enteric coated 81 milliGRAM(s) Oral daily  atorvastatin 40 milliGRAM(s) Oral at bedtime  dextrose 40% Gel 15 Gram(s) Oral once  dextrose 5%. 1000 milliLiter(s) (50 mL/Hr) IV Continuous <Continuous>  dextrose 5%. 1000 milliLiter(s) (100 mL/Hr) IV Continuous <Continuous>  dextrose 50% Injectable 25 Gram(s) IV Push once  dextrose 50% Injectable 12.5 Gram(s) IV Push once  dextrose 50% Injectable 25 Gram(s) IV Push once  enoxaparin Injectable 80 milliGRAM(s) SubCutaneous two times a day  finasteride 5 milliGRAM(s) Oral daily  glucagon  Injectable 1 milliGRAM(s) IntraMuscular once  insulin lispro (ADMELOG) corrective regimen sliding scale   SubCutaneous three times a day before meals  insulin lispro (ADMELOG) corrective regimen sliding scale   SubCutaneous at bedtime  isosorbide   mononitrate ER Tablet (IMDUR) 15 milliGRAM(s) Oral daily  metoprolol succinate ER 50 milliGRAM(s) Oral daily  metoprolol tartrate 25 milliGRAM(s) Oral once  ranolazine 500 milliGRAM(s) Oral two times a day  tamsulosin 0.8 milliGRAM(s) Oral at bedtime    MEDICATIONS  (PRN):      FAMILY HISTORY:  No pertinent family history in first degree relatives          ROS negative except as noted above      SOCIAL HISTORY:   Denies ETOH or illicit drug use  Former smoke, quit 50yrs ago    Vital Signs Last 24 Hrs  T(C): 36.4 (2020 06:34), Max: 36.4 (2020 06:34)  T(F): 97.6 (2020 06:34), Max: 97.6 (2020 06:34)  HR: 82 (2020 06:34) (82 - 101)  BP: 135/79 (2020 06:34) (135/79 - 182/84)  BP(mean): --  RR: 20 (2020 06:34) (15 - 20)  SpO2: 99% (2020 06:34) (98% - 100%)    Physical Exam:  General: Well developed, well nourished, NAD  HEENT: NCAT, EOMI bl  Neurology: A&Ox3, nonfocal, sensation intact   Respiratory: CTA B/L, No W/R/R  CV: RRR, +S1/S2, no murmurs, rubs or gallops  Abdominal: Soft, NT, ND +BSx4, no palpable masses  Extremities: No C/C/E, + peripheral pulses  MSK: Normal ROM, no joint erythema or warmth, no joint swelling   Heme: No obvious ecchymosis or petechiae   Skin: warm, dry, normal color      ECG: Initial ECG showed 1mm ST depressions in leads V4-6 and aVF, repeat ECG showed 2mm ST depressions in leads V4-5    I&O's Detail      LABS:                        11.4   5.24  )-----------( 165      ( 2020 06:45 )             34.1         144  |  110<H>  |  23  ----------------------------<  98  4.4   |  28  |  1.20    Ca    9.1      2020 06:45    TPro  7.8  /  Alb  3.9  /  TBili  0.7  /  DBili  x   /  AST  19  /  ALT  28  /  AlkPhos  59      CARDIAC MARKERS ( 2020 03:01 )  1.610 ng/mL / x     / 188 U/L / x     / 12.7 ng/mL  CARDIAC MARKERS ( 2020 21:48 )  <.015 ng/mL / x     / x     / x     / x            Urinalysis Basic - ( 2020 22:50 )    Color: Yellow / Appearance: Clear / S.010 / pH: x  Gluc: x / Ketone: Negative  / Bili: Negative / Urobili: Negative   Blood: x / Protein: 15 / Nitrite: Negative   Leuk Esterase: Trace / RBC: 0-2 /HPF / WBC 3-5   Sq Epi: x / Non Sq Epi: Few / Bacteria: Few      I&O's Summary    BNPSerum Pro-Brain Natriuretic Peptide: 330 pg/mL ( @ 21:48)   Patient is a 80y old  Male who presents with a chief complaint of r/o ACS (2020 23:15)    HPI:  81yo M with PMH of HTN, HLD, DM2 non insulin dependent, NSTEMI (2017), and BPH presents to the ED for chest pressure. Starting at 8pm today patient began experiencing sudden onset midsternal chest pressure and mild sweating. He notes lifting heavy groceries earlier in the afternoon and since then has felt fine until his onset of symptoms. Hx of NSTEMI in the past, patient states that sx are not similar in quality. He ate a large amount of food at around 2pm and thought the pain was due to GERD and took an antacid and took his home isosorbide nitrate x2. Pain did not subside so he decided to go to the ED. Patient currently feels well, notes relief of chest pressure. Denies ever having chest pain or radiation. Denies any HA, dizziness, diaphoresis, CP, palpitations, SOB, n/v, abdominal pain, or LE swelling.  of note pt exercises multiple times a week , both strength and cardio exercises . He never has symptoms of cp or sob with exertion.      In the ED  VS- 97.8F, 101HR, 182/84, 20RR, O2 sat 98% on RA  Labs showed trop neg x1, BNP normal, lipase WNL, Hb 11.6, Cr 1.4, glucose 193  Initial ECG showed 1mm ST depressions in leads V4-6 and aVF, repeat ECG showed 2mm ST depressions in leads V4-5  s/p  and famotidine 20mg IV (2020 23:15)      PAST MEDICAL & SURGICAL HISTORY:  MI (myocardial infarction)  10/17/2017    Diabetes    Hypertension    Hyperlipemia    H/O left inguinal hernia repair  LIH repair x3              ECHO  FINDINGS:      MEDICATIONS  (STANDING):  aspirin enteric coated 81 milliGRAM(s) Oral daily  atorvastatin 40 milliGRAM(s) Oral at bedtime  dextrose 40% Gel 15 Gram(s) Oral once  dextrose 5%. 1000 milliLiter(s) (50 mL/Hr) IV Continuous <Continuous>  dextrose 5%. 1000 milliLiter(s) (100 mL/Hr) IV Continuous <Continuous>  dextrose 50% Injectable 25 Gram(s) IV Push once  dextrose 50% Injectable 12.5 Gram(s) IV Push once  dextrose 50% Injectable 25 Gram(s) IV Push once  enoxaparin Injectable 80 milliGRAM(s) SubCutaneous two times a day  finasteride 5 milliGRAM(s) Oral daily  glucagon  Injectable 1 milliGRAM(s) IntraMuscular once  insulin lispro (ADMELOG) corrective regimen sliding scale   SubCutaneous three times a day before meals  insulin lispro (ADMELOG) corrective regimen sliding scale   SubCutaneous at bedtime  isosorbide   mononitrate ER Tablet (IMDUR) 15 milliGRAM(s) Oral daily  metoprolol succinate ER 50 milliGRAM(s) Oral daily  metoprolol tartrate 25 milliGRAM(s) Oral once  ranolazine 500 milliGRAM(s) Oral two times a day  tamsulosin 0.8 milliGRAM(s) Oral at bedtime    MEDICATIONS  (PRN):      FAMILY HISTORY:  No pertinent family history in first degree relatives          ROS negative except as noted above      SOCIAL HISTORY:   Denies ETOH or illicit drug use  Former smoke, quit 50yrs ago    Vital Signs Last 24 Hrs  T(C): 36.4 (2020 06:34), Max: 36.4 (2020 06:34)  T(F): 97.6 (2020 06:34), Max: 97.6 (2020 06:34)  HR: 82 (2020 06:34) (82 - 101)  BP: 135/79 (2020 06:34) (135/79 - 182/84)  BP(mean): --  RR: 20 (2020 06:34) (15 - 20)  SpO2: 99% (2020 06:34) (98% - 100%)    Physical Exam:  General: Well developed, well nourished, NAD  HEENT: NCAT, EOMI bl  Neurology: A&Ox3, nonfocal, sensation intact   Respiratory: CTA B/L, No W/R/R  CV: RRR, +S1/S2, no murmurs, rubs or gallops  Abdominal: Soft, NT, ND +BSx4, no palpable masses  Extremities: No C/C/E, + peripheral pulses  MSK: Normal ROM, no joint erythema or warmth, no joint swelling   Heme: No obvious ecchymosis or petechiae   Skin: warm, dry, normal color      ECG: Initial ECG showed 1mm ST depressions in leads V4-6 and aVF, repeat ECG showed 2mm ST depressions in leads V4-5    I&O's Detail      LABS:                        11.4   5.24  )-----------( 165      ( 2020 06:45 )             34.1         144  |  110<H>  |  23  ----------------------------<  98  4.4   |  28  |  1.20    Ca    9.1      2020 06:45    TPro  7.8  /  Alb  3.9  /  TBili  0.7  /  DBili  x   /  AST  19  /  ALT  28  /  AlkPhos  59      CARDIAC MARKERS ( 2020 03:01 )  1.610 ng/mL / x     / 188 U/L / x     / 12.7 ng/mL  CARDIAC MARKERS ( 2020 21:48 )  <.015 ng/mL / x     / x     / x     / x            Urinalysis Basic - ( 2020 22:50 )    Color: Yellow / Appearance: Clear / S.010 / pH: x  Gluc: x / Ketone: Negative  / Bili: Negative / Urobili: Negative   Blood: x / Protein: 15 / Nitrite: Negative   Leuk Esterase: Trace / RBC: 0-2 /HPF / WBC 3-5   Sq Epi: x / Non Sq Epi: Few / Bacteria: Few      I&O's Summary    BNPSerum Pro-Brain Natriuretic Peptide: 330 pg/mL ( @ 21:48)   Patient is a 80y old  Male who presents with a chief complaint of r/o ACS (2020 23:15)    HPI:  79yo M with PMH of HTN, HLD, DM2 non insulin dependent, NSTEMI (2017), and BPH presents to the ED for chest pressure. Starting at 8pm today patient began experiencing sudden onset midsternal chest pressure and mild sweating. He notes lifting heavy groceries earlier in the afternoon and since then has felt fine until his onset of symptoms. Hx of NSTEMI in the past, patient states that sx are not similar in quality. He ate a large amount of food at around 2pm and thought the pain was due to GERD and took an antacid and took his home isosorbide nitrate x2. Pain did not subside so he decided to go to the ED. Patient currently feels well, notes relief of chest pressure. Denies ever having chest pain or radiation. Denies any HA, dizziness, diaphoresis, CP, palpitations, SOB, n/v, abdominal pain, or LE swelling.  of note pt exercises multiple times a week , both strength and cardio exercises . He never has symptoms of cp or sob with exertion.      In the ED  VS- 97.8F, 101HR, 182/84, 20RR, O2 sat 98% on RA  Labs showed trop neg x1, BNP normal, lipase WNL, Hb 11.6, Cr 1.4, glucose 193  Initial ECG showed 1mm ST depressions in leads V4-6 and aVF, repeat ECG showed 2mm ST depressions in leads V4-5  s/p  and famotidine 20mg IV (2020 23:15)      PAST MEDICAL & SURGICAL HISTORY:  MI (myocardial infarction)  10/17/2017    Diabetes    Hypertension    Hyperlipemia    H/O left inguinal hernia repair  LIH repair x3              ECHO  FINDINGS:      MEDICATIONS  (STANDING):  aspirin enteric coated 81 milliGRAM(s) Oral daily  atorvastatin 40 milliGRAM(s) Oral at bedtime  dextrose 40% Gel 15 Gram(s) Oral once  dextrose 5%. 1000 milliLiter(s) (50 mL/Hr) IV Continuous <Continuous>  dextrose 5%. 1000 milliLiter(s) (100 mL/Hr) IV Continuous <Continuous>  dextrose 50% Injectable 25 Gram(s) IV Push once  dextrose 50% Injectable 12.5 Gram(s) IV Push once  dextrose 50% Injectable 25 Gram(s) IV Push once  enoxaparin Injectable 80 milliGRAM(s) SubCutaneous two times a day  finasteride 5 milliGRAM(s) Oral daily  glucagon  Injectable 1 milliGRAM(s) IntraMuscular once  insulin lispro (ADMELOG) corrective regimen sliding scale   SubCutaneous three times a day before meals  insulin lispro (ADMELOG) corrective regimen sliding scale   SubCutaneous at bedtime  isosorbide   mononitrate ER Tablet (IMDUR) 15 milliGRAM(s) Oral daily  metoprolol succinate ER 50 milliGRAM(s) Oral daily  metoprolol tartrate 25 milliGRAM(s) Oral once  ranolazine 500 milliGRAM(s) Oral two times a day  tamsulosin 0.8 milliGRAM(s) Oral at bedtime    MEDICATIONS  (PRN):      FAMILY HISTORY:  No pertinent family history in first degree relatives  No scd or early cad          ROS negative except as noted above      SOCIAL HISTORY:   Denies ETOH or illicit drug use  Former smoke, quit 50yrs ago    Vital Signs Last 24 Hrs  T(C): 36.4 (2020 06:34), Max: 36.4 (2020 06:34)  T(F): 97.6 (2020 06:34), Max: 97.6 (2020 06:34)  HR: 82 (2020 06:34) (82 - 101)  BP: 135/79 (2020 06:34) (135/79 - 182/84)  BP(mean): --  RR: 20 (2020 06:34) (15 - 20)  SpO2: 99% (2020 06:34) (98% - 100%)    Physical Exam:  General: Well developed, well nourished, NAD  HEENT: NCAT, EOMI bl  Neurology: A&Ox3, nonfocal, sensation intact   Respiratory: CTA B/L, No W/R/R  CV: RRR, +S1/S2, no murmurs, rubs or gallops  Abdominal: Soft, NT, ND +BSx4, no palpable masses  Extremities: No C/C/E, + peripheral pulses  MSK: Normal ROM, no joint erythema or warmth, no joint swelling   Heme: No obvious ecchymosis or petechiae   Skin: warm, dry, normal color      ECG: Initial ECG showed 1mm ST depressions in leads V4-6 and aVF, repeat ECG showed 2mm ST depressions in leads V4-5    I&O's Detail      LABS:                        11.4   5.24  )-----------( 165      ( 2020 06:45 )             34.1         144  |  110<H>  |  23  ----------------------------<  98  4.4   |  28  |  1.20    Ca    9.1      2020 06:45    TPro  7.8  /  Alb  3.9  /  TBili  0.7  /  DBili  x   /  AST  19  /  ALT  28  /  AlkPhos  59      CARDIAC MARKERS ( 2020 03:01 )  1.610 ng/mL / x     / 188 U/L / x     / 12.7 ng/mL  CARDIAC MARKERS ( 2020 21:48 )  <.015 ng/mL / x     / x     / x     / x            Urinalysis Basic - ( 2020 22:50 )    Color: Yellow / Appearance: Clear / S.010 / pH: x  Gluc: x / Ketone: Negative  / Bili: Negative / Urobili: Negative   Blood: x / Protein: 15 / Nitrite: Negative   Leuk Esterase: Trace / RBC: 0-2 /HPF / WBC 3-5   Sq Epi: x / Non Sq Epi: Few / Bacteria: Few      I&O's Summary    BNPSerum Pro-Brain Natriuretic Peptide: 330 pg/mL ( @ 21:48)

## 2020-11-21 LAB
ANION GAP SERPL CALC-SCNC: 9 MMOL/L — SIGNIFICANT CHANGE UP (ref 5–17)
BUN SERPL-MCNC: 17 MG/DL — SIGNIFICANT CHANGE UP (ref 7–23)
CALCIUM SERPL-MCNC: 9.7 MG/DL — SIGNIFICANT CHANGE UP (ref 8.4–10.5)
CHLORIDE SERPL-SCNC: 104 MMOL/L — SIGNIFICANT CHANGE UP (ref 96–108)
CO2 SERPL-SCNC: 25 MMOL/L — SIGNIFICANT CHANGE UP (ref 22–31)
CREAT SERPL-MCNC: 1.11 MG/DL — SIGNIFICANT CHANGE UP (ref 0.5–1.3)
GLUCOSE BLDC GLUCOMTR-MCNC: 109 MG/DL — HIGH (ref 70–99)
GLUCOSE BLDC GLUCOMTR-MCNC: 111 MG/DL — HIGH (ref 70–99)
GLUCOSE BLDC GLUCOMTR-MCNC: 139 MG/DL — HIGH (ref 70–99)
GLUCOSE BLDC GLUCOMTR-MCNC: 92 MG/DL — SIGNIFICANT CHANGE UP (ref 70–99)
GLUCOSE SERPL-MCNC: 111 MG/DL — HIGH (ref 70–99)
HCT VFR BLD CALC: 35.7 % — LOW (ref 39–50)
HGB BLD-MCNC: 11.5 G/DL — LOW (ref 13–17)
MAGNESIUM SERPL-MCNC: 1.8 MG/DL — SIGNIFICANT CHANGE UP (ref 1.6–2.6)
MCHC RBC-ENTMCNC: 31 PG — SIGNIFICANT CHANGE UP (ref 27–34)
MCHC RBC-ENTMCNC: 32.2 GM/DL — SIGNIFICANT CHANGE UP (ref 32–36)
MCV RBC AUTO: 96.2 FL — SIGNIFICANT CHANGE UP (ref 80–100)
NRBC # BLD: 0 /100 WBCS — SIGNIFICANT CHANGE UP (ref 0–0)
PHOSPHATE SERPL-MCNC: 3.1 MG/DL — SIGNIFICANT CHANGE UP (ref 2.5–4.5)
PLATELET # BLD AUTO: 181 K/UL — SIGNIFICANT CHANGE UP (ref 150–400)
POTASSIUM SERPL-MCNC: 4.6 MMOL/L — SIGNIFICANT CHANGE UP (ref 3.5–5.3)
POTASSIUM SERPL-SCNC: 4.6 MMOL/L — SIGNIFICANT CHANGE UP (ref 3.5–5.3)
RBC # BLD: 3.71 M/UL — LOW (ref 4.2–5.8)
RBC # FLD: 14.1 % — SIGNIFICANT CHANGE UP (ref 10.3–14.5)
SARS-COV-2 IGG SERPL QL IA: NEGATIVE — SIGNIFICANT CHANGE UP
SARS-COV-2 IGG SERPL QL IA: NEGATIVE — SIGNIFICANT CHANGE UP
SARS-COV-2 IGM SERPL IA-ACNC: 0.17 INDEX — SIGNIFICANT CHANGE UP
SARS-COV-2 IGM SERPL IA-ACNC: <0.1 INDEX — SIGNIFICANT CHANGE UP
SODIUM SERPL-SCNC: 138 MMOL/L — SIGNIFICANT CHANGE UP (ref 135–145)
UUN UR-MCNC: 526 MG/DL — SIGNIFICANT CHANGE UP
WBC # BLD: 6.24 K/UL — SIGNIFICANT CHANGE UP (ref 3.8–10.5)
WBC # FLD AUTO: 6.24 K/UL — SIGNIFICANT CHANGE UP (ref 3.8–10.5)

## 2020-11-21 PROCEDURE — 99222 1ST HOSP IP/OBS MODERATE 55: CPT

## 2020-11-21 PROCEDURE — 93010 ELECTROCARDIOGRAM REPORT: CPT

## 2020-11-21 PROCEDURE — 99233 SBSQ HOSP IP/OBS HIGH 50: CPT

## 2020-11-21 RX ORDER — INFLUENZA VIRUS VACCINE 15; 15; 15; 15 UG/.5ML; UG/.5ML; UG/.5ML; UG/.5ML
0.5 SUSPENSION INTRAMUSCULAR ONCE
Refills: 0 | Status: DISCONTINUED | OUTPATIENT
Start: 2020-11-21 | End: 2020-11-24

## 2020-11-21 RX ADMIN — TICAGRELOR 90 MILLIGRAM(S): 90 TABLET ORAL at 05:33

## 2020-11-21 RX ADMIN — TAMSULOSIN HYDROCHLORIDE 0.8 MILLIGRAM(S): 0.4 CAPSULE ORAL at 21:39

## 2020-11-21 RX ADMIN — FINASTERIDE 5 MILLIGRAM(S): 5 TABLET, FILM COATED ORAL at 12:16

## 2020-11-21 RX ADMIN — SODIUM CHLORIDE 75 MILLILITER(S): 9 INJECTION INTRAMUSCULAR; INTRAVENOUS; SUBCUTANEOUS at 06:55

## 2020-11-21 RX ADMIN — ATORVASTATIN CALCIUM 40 MILLIGRAM(S): 80 TABLET, FILM COATED ORAL at 21:40

## 2020-11-21 RX ADMIN — Medication 5 MILLIGRAM(S): at 21:39

## 2020-11-21 RX ADMIN — Medication 50 MILLIGRAM(S): at 05:33

## 2020-11-21 RX ADMIN — Medication 81 MILLIGRAM(S): at 12:16

## 2020-11-21 RX ADMIN — RANOLAZINE 500 MILLIGRAM(S): 500 TABLET, FILM COATED, EXTENDED RELEASE ORAL at 16:47

## 2020-11-21 RX ADMIN — SODIUM CHLORIDE 3 MILLILITER(S): 9 INJECTION INTRAMUSCULAR; INTRAVENOUS; SUBCUTANEOUS at 06:16

## 2020-11-21 RX ADMIN — TICAGRELOR 90 MILLIGRAM(S): 90 TABLET ORAL at 16:46

## 2020-11-21 RX ADMIN — RANOLAZINE 500 MILLIGRAM(S): 500 TABLET, FILM COATED, EXTENDED RELEASE ORAL at 05:33

## 2020-11-21 RX ADMIN — ISOSORBIDE MONONITRATE 15 MILLIGRAM(S): 60 TABLET, EXTENDED RELEASE ORAL at 12:16

## 2020-11-21 NOTE — PROGRESS NOTE ADULT - PROBLEM SELECTOR PLAN 2
[Midline] : trachea located in midline position [Normal] : no rashes Cr 1.4>1.2  Likely pre renal etiology  UA protein 15    -Monitor BMP  -Encourage po intake.   -Bladder scan w/o retention. Likely pre renal etiology  UA protein 15  -Monitor BMP  -Encourage po intake.   -Bladder scan w/o retention.

## 2020-11-21 NOTE — PROGRESS NOTE ADULT - PROBLEM SELECTOR PLAN 1
#History of NSTEMI, angina  #ACS  Presented at [facility] with chest pain 11/19, transferred for LHC  S/P LHC mid RCA 90% s/p FLORENCE x1; Cx mid 90%  -Continue to monitor on telemetry  -Continue metoprolol succinate 50mg po qd  -Continue ASA 81mg po qd, Brilinta 90mg po bid  -Continue home medications Imdur, Ranolazine  -Monitor BMP, electrolytes  -F/U cardiology for further recommendations  -Plan for staged PCI for Cx mid 90% on 11/23 per cardiology team

## 2020-11-21 NOTE — PROGRESS NOTE ADULT - SUBJECTIVE AND OBJECTIVE BOX
HOSPITALIST NOTE    Dr. Bahman Ugalde DO  Attending Physician  Division of Hospital Medicine  [facility]  Pager:  [phone]    SUBJECTIVE  No acute complaints.    REVIEW OF SYSTEMS  12 point review of systems negative except for items noted above.      PAST MEDICAL & SURGICAL HISTORY:  NOEL treated with BiPAP    BPH (benign prostatic hyperplasia)    MI (myocardial infarction)  10/17/2017    Diabetes    Hypertension    Hyperlipemia    History of nasal septoplasty    History of tonsillectomy    H/O left inguinal hernia repair  LIH repair x3        MEDICATIONS  (STANDING):  aspirin enteric coated 81 milliGRAM(s) Oral daily  atorvastatin 40 milliGRAM(s) Oral at bedtime  dextrose 40% Gel 15 Gram(s) Oral once  dextrose 50% Injectable 25 Gram(s) IV Push once  dextrose 50% Injectable 12.5 Gram(s) IV Push once  dextrose 50% Injectable 25 Gram(s) IV Push once  finasteride 5 milliGRAM(s) Oral daily  insulin lispro (ADMELOG) corrective regimen sliding scale   SubCutaneous three times a day before meals  insulin lispro (ADMELOG) corrective regimen sliding scale   SubCutaneous at bedtime  isosorbide   mononitrate ER Tablet (IMDUR) 15 milliGRAM(s) Oral daily  metoprolol succinate ER 50 milliGRAM(s) Oral daily  ranolazine 500 milliGRAM(s) Oral two times a day  tamsulosin 0.8 milliGRAM(s) Oral at bedtime  ticagrelor 90 milliGRAM(s) Oral every 12 hours    MEDICATIONS  (PRN):  melatonin 5 milliGRAM(s) Oral at bedtime PRN Insomnia      Allergies    No Known Allergies    Intolerances        T(C): 36.4 (20 @ 12:16), Max: 37.1 (20 @ 16:15)  T(F): 97.6 (20 @ 12:16), Max: 98.8 (20 @ 16:15)  HR: 64 (20 @ 12:16) (55 - 72)  BP: 142/79 (20 @ 12:16) (105/61 - 142/79)  ABP: --  ABP(mean): --  RR: 18 (20 @ 12:16) (17 - 19)  SpO2: 96% (20 @ 12:16) (96% - 100%)      CONSTITUTIONAL: No acute distress.   HEENT:  Conjunctiva clear B/L. Nasal mucosa normal. Moist oral mucosa. No posterior pharyngeal lesions noted.  Cardiovascular: RRR with no murmurs. No JVD noted. No lower extremity edema B/L. Extremities are warm and well perfused. Dorsalis pedis pulses 2+ B/L. Finger tips warm and well perfused b/l.  Respiratory: Lungs CTAB. No accessory muscle use.   Gastrointestinal:  Soft, nontender. Non-distended. Non-rigid. No CVA tenderness B/L.  MSK:  No joint swelling. No joint erythema B/L. No midline spinal tenderness.  Neurologic:  Alert and awake. Oriented x3. Moving all extremities. Following commands. Making eye contact.  No numbness/tingling.   Skin: right wrist with some mild ecchymoses from radial access. Mild tenderness. No hematoma.   Psych:  Normal affect. Normal Mood.     LABS                        11.5   6.24  )-----------( 181      ( 2020 06:22 )             35.7     -    138  |  104  |  17  ----------------------------<  111<H>  4.6   |  25  |  1.11    Ca    9.7      2020 09:44  Phos  3.1     -  Mg     1.8         TPro  7.8  /  Alb  3.9  /  TBili  0.7  /  DBili  x   /  AST  19  /  ALT  28  /  AlkPhos  59  11-19      Urinalysis Basic - ( 2020 22:50 )    Color: Yellow / Appearance: Clear / S.010 / pH: x  Gluc: x / Ketone: Negative  / Bili: Negative / Urobili: Negative   Blood: x / Protein: 15 / Nitrite: Negative   Leuk Esterase: Trace / RBC: 0-2 /HPF / WBC 3-5   Sq Epi: x / Non Sq Epi: Few / Bacteria: Few

## 2020-11-21 NOTE — PROGRESS NOTE ADULT - PROBLEM SELECTOR PLAN 4
Patient complaining of incomplete bladder emptying and frequent urination.   UA: trace leuk esterase, few bacteria  -Continue home medications Tamsulosin and Finasteride  -Bladder scan w/o urinary retention  - Check urine culture Patient complaining of incomplete bladder emptying and frequent urination.   UA: trace leuk esterase, few bacteria  -Continue home medications Tamsulosin and Finasteride  -Bladder scan w/o urinary retention  -urine wbc not consistent with uti.

## 2020-11-21 NOTE — CHART NOTE - NSCHARTNOTEFT_GEN_A_CORE
Cardiac Cath Site Assessment Note     HPI:    81 yo male with no implantable device, with PMHx of HTN, HLD, DM2 (A1C 5.7 on this admission), NSTEMI ([facility], [facility] [place] [facility]), NOEL on BIPAP, and BPH presents to the [facility] ED on 11/19 pm for chest pressure. pt reports he had 7-8/10 mid sternal chest pressure after carrying heavy grocery bags. Pressure had lasted about an hour did not relieved with NTG SL x2. Patient states that sx are not similar in quality to prior NSTEMI sx. Troponin 2.85<-1.61, BUN/Cr 27/1.4<-23/1.2, now transferred here for C.   Patient currently feels well, denies chest pain, SOB, HA, dizziness, diaphoresis, or palpitations.      now s/p cardiac cath yesterday:   C mid RCA 90% s/p FLORENCE x1, for stage PCI on Monday for RCA 90% lesion.     ECG: w/o significant ST or T wave changes   tele: SR 50's-70's    IF radial:   Right wrist stable w/ bleeding or hematoma; site soft, non tender.  mild ecchymosis.  Right radial pulse palpable +2.  Denies chest pain, denies right wrist/arm/hand:  pain, numbness, or tingling     cont DAPT   staged PCI for RCA lesion Monday  all other care as per primary team

## 2020-11-21 NOTE — CONSULT NOTE ADULT - ASSESSMENT
Patient is a 80 year old male with PMH of hypertension, hyperlipidemia, diabetes mellitus type 2, NSTEMI (2017, [facility] [place] [facility]), NOEL on BIPAP, BPH, initially presented to [facility] [place] with complaint of chest pain; transferred to [facility] for further management. S/P LHC, FLORENCE x1 RCA.  Per pervious notes in chart, patient reportedly started experiencing mid sternal chest pain after carrying grocery bags, not relieved with NTG SL x2. At [facility] [place], Initial ECG showed 1mm ST depressions in leads V4-6 and aVF, repeat ECG showed 2mm ST depressions in leads V4-5, received , famotidine, Troponin 2.85<-1.61, BUN/Cr 27/1.4<-23/1.2, transferred for LHC s/p DESx1 RCA and plan for staged PCI for LCx 11/23.    CAD s/p PCI  - plan for staged procedure to Cx 11/23  - c/w ASA and ticagrelor  - c/w statin  - c/w metoprolol and imdur  - if Cr remains stable start ACEi  - c/w ranexa   - check TTE    HTN  - well controlled  - c/w current meds    HLD  - c/w staitn, goal LDL <70    - Monitor and replete lytes, keep K>4 and Mg >2  - Further cardiac workup will depend on clinical course.   - All other workup per primary team  Thank you for the consult  Will continue to follow

## 2020-11-21 NOTE — PROGRESS NOTE ADULT - PROBLEM SELECTOR PLAN 5
At home patient takes Metformin 500mg po bid - hold while inpatient.  -A1C 5.7  -Continue ISS  -Monitor fingerstick glu checks ACHS

## 2020-11-21 NOTE — PROGRESS NOTE ADULT - ASSESSMENT
Patient is a 80 year old male with significant past medical history of hypertension, hyperlipidemia, diabetes mellitus type 2, NSTEMI (2017, [facility] [place] [facility]), NOEL on BIPAP, BPH, initially presented to [facility] with complaint of chest pain; transfered to [facility] for further management. S/P LHC, FLORENCE x1 RCA. Patient is admitted for further management and plan for staged PCI for LCx 11/23.

## 2020-11-21 NOTE — CONSULT NOTE ADULT - SUBJECTIVE AND OBJECTIVE BOX
· Subjective and Objective:   [facility] CARDIOLOGY CONSULTANTS:    Ashok Devine, Ravi Jo, Leon Paz Savella, Goodger      [phone]    CHIEF COMPLAINT: Patient is a 80y old  Male who presents with a chief complaint of chest pain (2020 18:13)    Patient is a 80 year old male with PMH of hypertension, hyperlipidemia, diabetes mellitus type 2, NSTEMI (2017, [facility] [place] [facility]), NOEL on BIPAP, BPH, initially presented to [facility] with complaint of chest pain; transferred to [facility] for further management. S/P LHC, FLORENCE x1 RCA.  Per pervious notes in chart, patient reportedly started experiencing mid sternal chest pain after carrying grocery bags, not relieved with NTG SL x2. At [facility], Initial ECG showed 1mm ST depressions in leads V4-6 and aVF, repeat ECG showed 2mm ST depressions in leads V4-5, received , famotidine, Troponin 2.85<-1.61, BUN/Cr 27/1.4<-23/1.2, transferred for LH s/p DESx1 RCA and plan for staged PCI for LCx . Currently, patient is resting comfortably in bed, with no complaint. Reports that chest pain is completely resolved. Denies headache, dizziness, fever, chills, vision changes, cough, sob, sore throat, dysphagia N/V, chest pressure/pain, palpitations, abdominal pain, dysuria, hematuria, diarrhea.    TELEMETRY: NSR/sinus asha    ROS otherwise negative unless noted        PAST MEDICAL & SURGICAL HISTORY:  NOEL treated with BiPAP    BPH (benign prostatic hyperplasia)    MI (myocardial infarction)  10/17/2017    Diabetes    Hypertension    Hyperlipemia    History of nasal septoplasty    History of tonsillectomy    H/O left inguinal hernia repair  LIH repair x3        MEDICATIONS  (STANDING):  aspirin enteric coated 81 milliGRAM(s) Oral daily  atorvastatin 40 milliGRAM(s) Oral at bedtime  dextrose 40% Gel 15 Gram(s) Oral once  dextrose 50% Injectable 25 Gram(s) IV Push once  dextrose 50% Injectable 12.5 Gram(s) IV Push once  dextrose 50% Injectable 25 Gram(s) IV Push once  finasteride 5 milliGRAM(s) Oral daily  insulin lispro (ADMELOG) corrective regimen sliding scale   SubCutaneous three times a day before meals  insulin lispro (ADMELOG) corrective regimen sliding scale   SubCutaneous at bedtime  isosorbide   mononitrate ER Tablet (IMDUR) 15 milliGRAM(s) Oral daily  metoprolol succinate ER 50 milliGRAM(s) Oral daily  ranolazine 500 milliGRAM(s) Oral two times a day  tamsulosin 0.8 milliGRAM(s) Oral at bedtime  ticagrelor 90 milliGRAM(s) Oral every 12 hours      Allergies    No Known Allergies    Intolerances                              11.5   6.24  )-----------( 181      ( 2020 06:22 )             35.7       11-    138  |  104  |  17  ----------------------------<  111<H>  4.6   |  25  |  1.11    Ca    9.7      2020 09:44  Phos  3.1       Mg     1.8         TPro  7.8  /  Alb  3.9  /  TBili  0.7  /  DBili  x   /  AST  19  /  ALT  28  /  AlkPhos  59  11-19      LIVER FUNCTIONS - ( 2020 21:48 )  Alb: 3.9 g/dL / Pro: 7.8 g/dL / ALK PHOS: 59 U/L / ALT: 28 U/L / AST: 19 U/L / GGT: x                 CARDIAC MARKERS ( 2020 08:44 )  2.850 ng/mL / x     / 245 U/L / x     / 16.8 ng/mL  CARDIAC MARKERS ( 2020 03:01 )  1.610 ng/mL / x     / 188 U/L / x     / 12.7 ng/mL  CARDIAC MARKERS ( 2020 21:48 )  <.015 ng/mL / x     / x     / x     / x                        Daily Height in cm: 177.8 (2020 23:34)    Daily Weight in k (2020 04:34)    I&O's Summary    2020 07:01  -  2020 07:00  --------------------------------------------------------  IN: 300 mL / OUT: 1750 mL / NET: -1450 mL    2020 07:01  -  2020 10:49  --------------------------------------------------------  IN: 240 mL / OUT: 400 mL / NET: -160 mL        Vital Signs Last 24 Hrs  T(C): 36.6 (2020 08:45), Max: 37.1 (2020 16:15)  T(F): 97.9 (2020 08:45), Max: 98.8 (2020 16:15)  HR: 59 (2020 08:53) (55 - 77)  BP: 136/69 (2020 08:45) (105/61 - 156/78)  BP(mean): 100 (2020 11:56) (100 - 100)  RR: 18 (2020 08:45) (16 - 19)  SpO2: 96% (2020 08:53) (96% - 100%)    PHYSICAL EXAM:   · Constitutional	Well-developed, well nourished  · Eyes	EOMI; PERRL; no drainage or redness  · ENMT	No oral lesions; no gross abnormalities  · Neck	No bruits; no thyromegaly or nodules  · Respiratory	Normal breath sounds b/l, No RRW  · Cardiovascular	Regular rate & rhythm, normal S1, S2; no murmurs, gallops or rubs; no S3, S4  · Gastrointestinal	Soft, non-tender, no hepatosplenomegaly, normal bowel sounds  · Extremities	No cyanosis, clubbing or edema  · Vascular	Equal and normal pulses (carotid, femoral, dorsalis pedis)  · Neurological	Alert & oriented; no sensory, motor or coordination deficits, normal reflexes

## 2020-11-22 LAB
CULTURE RESULTS: SIGNIFICANT CHANGE UP
GLUCOSE BLDC GLUCOMTR-MCNC: 104 MG/DL — HIGH (ref 70–99)
GLUCOSE BLDC GLUCOMTR-MCNC: 107 MG/DL — HIGH (ref 70–99)
GLUCOSE BLDC GLUCOMTR-MCNC: 120 MG/DL — HIGH (ref 70–99)
GLUCOSE BLDC GLUCOMTR-MCNC: 97 MG/DL — SIGNIFICANT CHANGE UP (ref 70–99)
SPECIMEN SOURCE: SIGNIFICANT CHANGE UP

## 2020-11-22 PROCEDURE — 85025 COMPLETE CBC W/AUTO DIFF WBC: CPT

## 2020-11-22 PROCEDURE — 82550 ASSAY OF CK (CPK): CPT

## 2020-11-22 PROCEDURE — 36415 COLL VENOUS BLD VENIPUNCTURE: CPT

## 2020-11-22 PROCEDURE — 84484 ASSAY OF TROPONIN QUANT: CPT

## 2020-11-22 PROCEDURE — 86769 SARS-COV-2 COVID-19 ANTIBODY: CPT

## 2020-11-22 PROCEDURE — 99232 SBSQ HOSP IP/OBS MODERATE 35: CPT

## 2020-11-22 PROCEDURE — 99291 CRITICAL CARE FIRST HOUR: CPT | Mod: 25

## 2020-11-22 PROCEDURE — 96374 THER/PROPH/DIAG INJ IV PUSH: CPT

## 2020-11-22 PROCEDURE — 81001 URINALYSIS AUTO W/SCOPE: CPT

## 2020-11-22 PROCEDURE — 82553 CREATINE MB FRACTION: CPT

## 2020-11-22 PROCEDURE — 80048 BASIC METABOLIC PNL TOTAL CA: CPT

## 2020-11-22 PROCEDURE — 82962 GLUCOSE BLOOD TEST: CPT

## 2020-11-22 PROCEDURE — 93005 ELECTROCARDIOGRAM TRACING: CPT

## 2020-11-22 PROCEDURE — 83880 ASSAY OF NATRIURETIC PEPTIDE: CPT

## 2020-11-22 PROCEDURE — 83690 ASSAY OF LIPASE: CPT

## 2020-11-22 PROCEDURE — 85027 COMPLETE CBC AUTOMATED: CPT

## 2020-11-22 PROCEDURE — U0003: CPT

## 2020-11-22 PROCEDURE — 83036 HEMOGLOBIN GLYCOSYLATED A1C: CPT

## 2020-11-22 PROCEDURE — 80061 LIPID PANEL: CPT

## 2020-11-22 PROCEDURE — 80053 COMPREHEN METABOLIC PANEL: CPT

## 2020-11-22 PROCEDURE — 99233 SBSQ HOSP IP/OBS HIGH 50: CPT

## 2020-11-22 PROCEDURE — 71045 X-RAY EXAM CHEST 1 VIEW: CPT

## 2020-11-22 RX ADMIN — Medication 5 MILLIGRAM(S): at 22:35

## 2020-11-22 RX ADMIN — RANOLAZINE 500 MILLIGRAM(S): 500 TABLET, FILM COATED, EXTENDED RELEASE ORAL at 06:20

## 2020-11-22 RX ADMIN — Medication 50 MILLIGRAM(S): at 06:20

## 2020-11-22 RX ADMIN — ATORVASTATIN CALCIUM 40 MILLIGRAM(S): 80 TABLET, FILM COATED ORAL at 22:35

## 2020-11-22 RX ADMIN — Medication 81 MILLIGRAM(S): at 12:53

## 2020-11-22 RX ADMIN — TICAGRELOR 90 MILLIGRAM(S): 90 TABLET ORAL at 06:20

## 2020-11-22 RX ADMIN — ISOSORBIDE MONONITRATE 15 MILLIGRAM(S): 60 TABLET, EXTENDED RELEASE ORAL at 12:53

## 2020-11-22 RX ADMIN — TAMSULOSIN HYDROCHLORIDE 0.8 MILLIGRAM(S): 0.4 CAPSULE ORAL at 22:35

## 2020-11-22 RX ADMIN — FINASTERIDE 5 MILLIGRAM(S): 5 TABLET, FILM COATED ORAL at 12:53

## 2020-11-22 RX ADMIN — TICAGRELOR 90 MILLIGRAM(S): 90 TABLET ORAL at 17:47

## 2020-11-22 RX ADMIN — RANOLAZINE 500 MILLIGRAM(S): 500 TABLET, FILM COATED, EXTENDED RELEASE ORAL at 17:46

## 2020-11-22 NOTE — PROGRESS NOTE ADULT - SUBJECTIVE AND OBJECTIVE BOX
HOSPITALIST NOTE    Dr. Bahman Ugalde DO  Attending Physician  Division of Hospital Medicine  [facility]  Pager:  [phone]    SUBJECTIVE  No acute complaints.    REVIEW OF SYSTEMS     12 point review of systems negative except for items noted above.      PAST MEDICAL & SURGICAL HISTORY:  NOEL treated with BiPAP    BPH (benign prostatic hyperplasia)    MI (myocardial infarction)  10/17/2017    Diabetes    Hypertension    Hyperlipemia    History of nasal septoplasty    History of tonsillectomy    H/O left inguinal hernia repair  LIH repair x3        MEDICATIONS  (STANDING):  aspirin enteric coated 81 milliGRAM(s) Oral daily  atorvastatin 40 milliGRAM(s) Oral at bedtime  dextrose 40% Gel 15 Gram(s) Oral once  dextrose 50% Injectable 25 Gram(s) IV Push once  dextrose 50% Injectable 12.5 Gram(s) IV Push once  dextrose 50% Injectable 25 Gram(s) IV Push once  finasteride 5 milliGRAM(s) Oral daily  influenza   Vaccine 0.5 milliLiter(s) IntraMuscular once  insulin lispro (ADMELOG) corrective regimen sliding scale   SubCutaneous three times a day before meals  insulin lispro (ADMELOG) corrective regimen sliding scale   SubCutaneous at bedtime  isosorbide   mononitrate ER Tablet (IMDUR) 15 milliGRAM(s) Oral daily  metoprolol succinate ER 50 milliGRAM(s) Oral daily  ranolazine 500 milliGRAM(s) Oral two times a day  tamsulosin 0.8 milliGRAM(s) Oral at bedtime  ticagrelor 90 milliGRAM(s) Oral every 12 hours    MEDICATIONS  (PRN):  melatonin 5 milliGRAM(s) Oral at bedtime PRN Insomnia      Allergies    No Known Allergies    Intolerances        T(C): 36.4 (11-22-20 @ 11:34), Max: 36.6 (11-21-20 @ 21:23)  T(F): 97.6 (11-22-20 @ 11:34), Max: 97.8 (11-21-20 @ 21:23)  HR: 56 (11-22-20 @ 11:34) (52 - 64)  BP: 131/73 (11-22-20 @ 11:34) (112/72 - 131/73)  ABP: --  ABP(mean): --  RR: 18 (11-22-20 @ 11:34) (18 - 18)  SpO2: 97% (11-22-20 @ 11:34) (95% - 97%)    CONSTITUTIONAL: No acute distress.   HEENT:  Conjunctiva clear B/L. Nasal mucosa normal. Moist oral mucosa.    Cardiovascular: RRR with no murmurs. No JVD noted. No lower extremity edema B/L. Extremities are warm and well perfused. Dorsalis pedis pulses 2+ B/L. Finger tips warm and well perfused b/l.  Respiratory: Lungs CTAB. No accessory muscle use.   Gastrointestinal:  Soft, nontender. Non-distended. Non-rigid. No CVA tenderness B/L.  MSK:  No joint swelling. No joint erythema B/L. No midline spinal tenderness.  Neurologic:  Alert and awake. Oriented x3. Moving all extremities. Following commands. Making eye contact.  No numbness/tingling.   Skin: right wrist with some mild ecchymoses from radial access. Mild tenderness. No hematoma.   Psych:  Normal affect. Normal Mood.     LABS                        11.5   6.24  )-----------( 181      ( 21 Nov 2020 06:22 )             35.7     11-21    138  |  104  |  17  ----------------------------<  111<H>  4.6   |  25  |  1.11    Ca    9.7      21 Nov 2020 09:44  Phos  3.1     11-21  Mg     1.8     11-21

## 2020-11-22 NOTE — PROGRESS NOTE ADULT - ASSESSMENT
Call and let him know sent to [facility]   Patient is a 80 year old male with significant past medical history of hypertension, hyperlipidemia, diabetes mellitus type 2, NSTEMI (2017, [facility] [place] no [facility]), NOEL on BIPAP, BPH, initially presented to [facility] with complaint of chest pain; transfered to [facility] for further management. S/P LHC, FLORENCE x1 RCA. Patient is admitted for further management and plan for staged PCI for LCx 11/23.

## 2020-11-22 NOTE — PROGRESS NOTE ADULT - PROBLEM SELECTOR PLAN 4
Patient complaining of incomplete bladder emptying and frequent urination.   UA: trace leuk esterase, few bacteria  -Continue home medications Tamsulosin and Finasteride  -Bladder scan w/o urinary retention  -urine wbc not consistent with uti.

## 2020-11-22 NOTE — PROGRESS NOTE ADULT - PROBLEM SELECTOR PLAN 1
#History of NSTEMI, angina  #ACS  Presented at [facility] with chest pain 11/19, transferred for LHC  S/P LHC mid RCA 90% s/p FLORENCE x1; Cx mid 90%  -Continue to monitor on telemetry  -Continue metoprolol succinate 50mg po qd  -Continue ASA 81mg po qd, Brilinta 90mg po bid  -Continue home medications Imdur, Ranolazine  -Monitor BMP, electrolytes  -F/U cardiology for further recommendations  -Plan for staged PCI for Cx mid 90% on 11/23 per cardiology team  -ACE-I or ARB to be introduced if Cr remains stable.

## 2020-11-22 NOTE — PROGRESS NOTE ADULT - ASSESSMENT
Patient is a 80 year old male with PMH of hypertension, hyperlipidemia, diabetes mellitus type 2, NSTEMI (2017, [facility] [place] [facility] PCI), NOEL on BIPAP, BPH, initially presented to [facility] with complaint of chest pain; transferred to [facility] for further management. S/P LHC, FLORENCE x1 RCA.  Per pervious notes in chart, patient reportedly started experiencing mid sternal chest pain after carrying grocery bags, not relieved with NTG SL x2. At [facility], Initial ECG showed 1mm ST depressions in leads V4-6 and aVF, repeat ECG showed 2mm ST depressions in leads V4-5, received , famotidine, Troponin 2.85<-1.61, BUN/Cr 27/1.4<-23/1.2, transferred for LHC s/p DESx1 RCA and plan for staged PCI for LCx 11/23.    CAD s/p PCI  - plan for staged procedure to Cx 11/23  - c/w ASA and ticagrelor  - c/w statin  - c/w metoprolol and imdur  - if Cr remains stable start ACEi  - c/w ranexa   - check TTE    HTN  - well controlled  - c/w current meds    HLD  - c/w statin, goal LDL <70    - Monitor and replete lytes, keep K>4 and Mg >2  - Further cardiac workup will depend on clinical course.   - All other workup per primary team  - Will continue to follow

## 2020-11-22 NOTE — PROGRESS NOTE ADULT - PROBLEM SELECTOR PLAN 2
Likely pre renal etiology  UA protein 15  -Monitor BMP  -Encourage po intake.   -Bladder scan w/o retention.

## 2020-11-22 NOTE — PROGRESS NOTE ADULT - SUBJECTIVE AND OBJECTIVE BOX
· Subjective and Objective:   [facility] CARDIOLOGY CONSULTANTS:    Ashok Devine, Ravi Jo, Leon Paz Savella, Goodger      [phone]    CHIEF COMPLAINT: Patient is a 80y old  Male who presents with a chief complaint of chest pain (21 Nov 2020 14:18)    FOLLOW UP:  CAD, PCI    INTERIM HISTORY: Pt has no complaints today, there were no overnight events.  He denies CP, SOB, palpitations, dizziness    TELEMETRY: NSR/sinus asha    ROS otherwise negative unless noted        PAST MEDICAL & SURGICAL HISTORY:  NOEL treated with BiPAP    BPH (benign prostatic hyperplasia)    MI (myocardial infarction)  10/17/2017    Diabetes    Hypertension    Hyperlipemia    History of nasal septoplasty    History of tonsillectomy    H/O left inguinal hernia repair  LIH repair x3        MEDICATIONS  (STANDING):  aspirin enteric coated 81 milliGRAM(s) Oral daily  atorvastatin 40 milliGRAM(s) Oral at bedtime  dextrose 40% Gel 15 Gram(s) Oral once  dextrose 50% Injectable 25 Gram(s) IV Push once  dextrose 50% Injectable 12.5 Gram(s) IV Push once  dextrose 50% Injectable 25 Gram(s) IV Push once  finasteride 5 milliGRAM(s) Oral daily  influenza   Vaccine 0.5 milliLiter(s) IntraMuscular once  insulin lispro (ADMELOG) corrective regimen sliding scale   SubCutaneous three times a day before meals  insulin lispro (ADMELOG) corrective regimen sliding scale   SubCutaneous at bedtime  isosorbide   mononitrate ER Tablet (IMDUR) 15 milliGRAM(s) Oral daily  metoprolol succinate ER 50 milliGRAM(s) Oral daily  ranolazine 500 milliGRAM(s) Oral two times a day  tamsulosin 0.8 milliGRAM(s) Oral at bedtime  ticagrelor 90 milliGRAM(s) Oral every 12 hours      Allergies    No Known Allergies    Intolerances                              11.5   6.24  )-----------( 181      ( 21 Nov 2020 06:22 )             35.7       11-21    138  |  104  |  17  ----------------------------<  111<H>  4.6   |  25  |  1.11    Ca    9.7      21 Nov 2020 09:44  Phos  3.1     11-21  Mg     1.8     11-21                                  Daily     Daily     I&O's Summary    21 Nov 2020 07:01  -  22 Nov 2020 07:00  --------------------------------------------------------  IN: 840 mL / OUT: 2600 mL / NET: -1760 mL        Vital Signs Last 24 Hrs  T(C): 36.4 (22 Nov 2020 04:37), Max: 36.6 (21 Nov 2020 21:23)  T(F): 97.6 (22 Nov 2020 04:37), Max: 97.8 (21 Nov 2020 21:23)  HR: 61 (22 Nov 2020 09:25) (52 - 64)  BP: 112/72 (22 Nov 2020 04:37) (112/72 - 142/79)  BP(mean): --  RR: 18 (22 Nov 2020 04:37) (18 - 18)  SpO2: 96% (22 Nov 2020 09:25) (95% - 97%)    PHYSICAL EXAM:   · Constitutional	Well-developed, well nourished  · Eyes	EOMI; PERRL; no drainage or redness  · ENMT	No oral lesions; no gross abnormalities  · Neck	No bruits; no thyromegaly or nodules  · Respiratory	Normal breath sounds b/l, No RRW  · Cardiovascular	Regular rate & rhythm, normal S1, S2; no murmurs, gallops or rubs; no S3, S4  · Gastrointestinal	Soft, non-tender, no hepatosplenomegaly, normal bowel sounds  · Extremities	No cyanosis, clubbing or edema  · Vascular	Equal and normal pulses (carotid, femoral, dorsalis pedis)  · Neurological	Alert & oriented; no sensory, motor or coordination deficits, normal reflexes

## 2020-11-23 LAB
APTT BLD: 34.9 SEC — SIGNIFICANT CHANGE UP (ref 27.5–35.5)
BLD GP AB SCN SERPL QL: NEGATIVE — SIGNIFICANT CHANGE UP
GLUCOSE BLDC GLUCOMTR-MCNC: 101 MG/DL — HIGH (ref 70–99)
GLUCOSE BLDC GLUCOMTR-MCNC: 101 MG/DL — HIGH (ref 70–99)
GLUCOSE BLDC GLUCOMTR-MCNC: 116 MG/DL — HIGH (ref 70–99)
GLUCOSE BLDC GLUCOMTR-MCNC: 122 MG/DL — HIGH (ref 70–99)
GLUCOSE BLDC GLUCOMTR-MCNC: 85 MG/DL — SIGNIFICANT CHANGE UP (ref 70–99)
INR BLD: 1.11 RATIO — SIGNIFICANT CHANGE UP (ref 0.88–1.16)
PROTHROM AB SERPL-ACNC: 13.3 SEC — SIGNIFICANT CHANGE UP (ref 10.6–13.6)
RH IG SCN BLD-IMP: POSITIVE — SIGNIFICANT CHANGE UP

## 2020-11-23 PROCEDURE — 93010 ELECTROCARDIOGRAM REPORT: CPT

## 2020-11-23 PROCEDURE — 99233 SBSQ HOSP IP/OBS HIGH 50: CPT

## 2020-11-23 PROCEDURE — 99152 MOD SED SAME PHYS/QHP 5/>YRS: CPT

## 2020-11-23 PROCEDURE — 92928 PRQ TCAT PLMT NTRAC ST 1 LES: CPT | Mod: LC

## 2020-11-23 PROCEDURE — 99232 SBSQ HOSP IP/OBS MODERATE 35: CPT

## 2020-11-23 RX ORDER — TICAGRELOR 90 MG/1
1 TABLET ORAL
Qty: 60 | Refills: 0
Start: 2020-11-23 | End: 2020-12-22

## 2020-11-23 RX ORDER — SODIUM CHLORIDE 9 MG/ML
250 INJECTION INTRAMUSCULAR; INTRAVENOUS; SUBCUTANEOUS ONCE
Refills: 0 | Status: COMPLETED | OUTPATIENT
Start: 2020-11-23 | End: 2020-11-23

## 2020-11-23 RX ORDER — SODIUM CHLORIDE 9 MG/ML
100 INJECTION INTRAMUSCULAR; INTRAVENOUS; SUBCUTANEOUS ONCE
Refills: 0 | Status: COMPLETED | OUTPATIENT
Start: 2020-11-23 | End: 2020-11-23

## 2020-11-23 RX ORDER — ASPIRIN/CALCIUM CARB/MAGNESIUM 324 MG
1 TABLET ORAL
Qty: 90 | Refills: 3
Start: 2020-11-23 | End: 2021-11-17

## 2020-11-23 RX ADMIN — Medication 81 MILLIGRAM(S): at 10:42

## 2020-11-23 RX ADMIN — RANOLAZINE 500 MILLIGRAM(S): 500 TABLET, FILM COATED, EXTENDED RELEASE ORAL at 05:40

## 2020-11-23 RX ADMIN — TAMSULOSIN HYDROCHLORIDE 0.8 MILLIGRAM(S): 0.4 CAPSULE ORAL at 21:59

## 2020-11-23 RX ADMIN — SODIUM CHLORIDE 250 MILLILITER(S): 9 INJECTION INTRAMUSCULAR; INTRAVENOUS; SUBCUTANEOUS at 13:40

## 2020-11-23 RX ADMIN — ATORVASTATIN CALCIUM 40 MILLIGRAM(S): 80 TABLET, FILM COATED ORAL at 21:58

## 2020-11-23 RX ADMIN — TICAGRELOR 90 MILLIGRAM(S): 90 TABLET ORAL at 05:40

## 2020-11-23 RX ADMIN — Medication 5 MILLIGRAM(S): at 21:59

## 2020-11-23 RX ADMIN — FINASTERIDE 5 MILLIGRAM(S): 5 TABLET, FILM COATED ORAL at 15:14

## 2020-11-23 RX ADMIN — TICAGRELOR 90 MILLIGRAM(S): 90 TABLET ORAL at 17:16

## 2020-11-23 RX ADMIN — SODIUM CHLORIDE 100 MILLILITER(S): 9 INJECTION INTRAMUSCULAR; INTRAVENOUS; SUBCUTANEOUS at 14:41

## 2020-11-23 RX ADMIN — RANOLAZINE 500 MILLIGRAM(S): 500 TABLET, FILM COATED, EXTENDED RELEASE ORAL at 17:16

## 2020-11-23 NOTE — PROGRESS NOTE ADULT - ASSESSMENT
Patient is a 80 year old male with PMH of hypertension, hyperlipidemia, diabetes mellitus type 2, NSTEMI (2017, [facility] [place] no PCI), NOEL on BIPAP, BPH, initially presented to [facility] with complaint of chest pain; transferred to [facility] for further management. S/P LHC, FLORENCE x1 RCA.  Per pervious notes in chart, patient reportedly started experiencing mid sternal chest pain after carrying grocery bags, not relieved with NTG SL x2. At [facility], Initial ECG showed 1mm ST depressions in leads V4-6 and aVF, repeat ECG showed 2mm ST depressions in leads V4-5, received , famotidine, Troponin 2.85<-1.61, BUN/Cr 27/1.4<-23/1.2, transferred for LHC s/p DESx1 RCA and plan for staged PCI for LCx 11/23.    CAD s/p PCI  - plan for staged procedure to Cx today  - c/w ASA and ticagrelor  - c/w statin  - c/w imdur  - bb held for sinus bradycardia  - if Cr remains, will eventually start ace post procedure  - c/w ranexa   - check TTE    HTN  - well controlled, on lower side this morning  - c/w current meds    HLD  - c/w statin, goal LDL <70    - Monitor and replete lytes, keep K>4 and Mg >2  - Further cardiac workup will depend on clinical course.   - All other workup per primary team  - Will continue to follow

## 2020-11-23 NOTE — PROGRESS NOTE ADULT - PROBLEM SELECTOR PLAN 2
Likely pre-renal etiology  UA protein 15  - Monitor BMP  - Encourage po intake  - Bladder scan w/o retention

## 2020-11-23 NOTE — PROGRESS NOTE ADULT - ATTENDING COMMENTS
Adriana Jason MD  Division of Hospital Medicine  [facility]   Pager: [phone]    TTE and PT eval pending.

## 2020-11-23 NOTE — PROGRESS NOTE ADULT - ASSESSMENT
80 year old male with PMH of hypertension, hyperlipidemia, diabetes mellitus type 2, NSTEMI (2017, [facility] [place] [facility] PCI), NOEL on BIPAP, BPH, p/w NSTEMI S/P LHC with DESx1 RCA and plan for staged PCI for LCx 11/23.    CAD   S/P LHC with DESx1 RCA and plan for staged PCI for LCx 11/23  Cont Aspirin/ Brilinta/ Lipitor  BB held for sinus bradycardia     Neto Her MD  Cardiology Fellow  [phone]    Please check [email or web address]: [email or web address]" for cardiology service schedule and contact information.

## 2020-11-23 NOTE — PROGRESS NOTE ADULT - SUBJECTIVE AND OBJECTIVE BOX
Patient seen and examined at bedside.    Overnight Events:  No overnight events     Review Of Systems: No chest pain, shortness of breath, or palpitations            Current Meds:  aspirin enteric coated 81 milliGRAM(s) Oral daily  atorvastatin 40 milliGRAM(s) Oral at bedtime  dextrose 40% Gel 15 Gram(s) Oral once  dextrose 50% Injectable 25 Gram(s) IV Push once  dextrose 50% Injectable 12.5 Gram(s) IV Push once  dextrose 50% Injectable 25 Gram(s) IV Push once  finasteride 5 milliGRAM(s) Oral daily  influenza   Vaccine 0.5 milliLiter(s) IntraMuscular once  insulin lispro (ADMELOG) corrective regimen sliding scale   SubCutaneous three times a day before meals  insulin lispro (ADMELOG) corrective regimen sliding scale   SubCutaneous at bedtime  isosorbide   mononitrate ER Tablet (IMDUR) 15 milliGRAM(s) Oral daily  melatonin 5 milliGRAM(s) Oral at bedtime PRN  metoprolol succinate ER 50 milliGRAM(s) Oral daily  ranolazine 500 milliGRAM(s) Oral two times a day  tamsulosin 0.8 milliGRAM(s) Oral at bedtime  ticagrelor 90 milliGRAM(s) Oral every 12 hours      Vitals:  T(F): 97.3 (11-23), Max: 98.1 (11-22)  HR: 56 (11-23) (54 - 63)  BP: 96/59 (11-23) (96/59 - 131/73)  RR: 18 (11-23)  SpO2: 95% (11-23)  I&O's Summary    2020 07:01  -  2020 07:00  --------------------------------------------------------  IN: 660 mL / OUT: 1400 mL / NET: -740 mL        Physical Exam:  Appearance: No acute distress; well appearing  Eyes: EOMI, pink conjunctiva  HEENT: Normal oral mucosa  Cardiovascular: RRR, S1, S2, no murmurs, rubs, or gallops; no edema; right radial access site  Respiratory: Clear to auscultation bilaterally  Gastrointestinal: soft, non-tender  Musculoskeletal: No clubbing; no joint deformity   Neurologic: Non-focal  Psychiatry: AAOx3, mood & affect appropriate  Skin: No rashes          138  |  104  |  17  ----------------------------<  111<H>  4.6   |  25  |  1.11    Ca    9.7      2020 09:44  Phos  3.1       Mg     1.8           PT/INR - ( 2020 06:06 )   PT: 13.3 sec;   INR: 1.11 ratio         PTT - ( 2020 06:06 )  PTT:34.9 sec  CARDIAC MARKERS ( 2020 08:44 )  x     / 2.850 ng/mL / x     / 245 U/L / x     / 16.8 ng/mL  CARDIAC MARKERS ( 2020 03:01 )  x     / 1.610 ng/mL / x     / 188 U/L / x     / 12.7 ng/mL  CARDIAC MARKERS ( 2020 21:48 )  x     / <.015 ng/mL / x     / x     / x     / x          Serum Pro-Brain Natriuretic Peptide: 330 pg/mL ( @ 21:48)          Echo: None      Cath: < from: Cardiac Cath Lab - Adult (20 @ 15:06) >  Case Physician(s):  AKI Piper M.D.  Fellow:  AKI Hudson M.D.  Referring Physician:  Ramos Quintero M.D.  INDICATIONS: Initial NSTEMI.  HISTORY: There was no prior cardiac history. The patient has hypertension,  medication-treated dyslipidemia, and a family history of coronary artery  disease.  PROCEDURE:  --  Left heart catheterization.  --  Left coronary angiography.  --  Right coronary angiography.  --  Intervention on mid RCA: drug-eluting stent, balloon angioplasty.  TECHNIQUE: The risks and alternatives of the procedures and conscious  sedation were explained to the patient and informed consent was obtained.  Cardiac catheterization performed electively.  Local anesthetic given. Right radial artery access. A 6FR PRELUDE KIT was  inserted in the vessel. Left heart catheterization. Left coronary artery  angiography. The vessel was injected utilizing a catheter. Right coronary  artery angiography. The vessel was injected utilizing a catheter.  RADIATION EXPOSURE: 18.6 min. A successful drug-eluting stent with balloon  angioplasty was performed on the 95 % lesion in the mid RCA. Following  intervention there was an excellent angiographicappearance with a 1 %  residual stenosis. This was a bifurcation lesion. According to the ACC/AHA  classification system, this lesion was a type C lesion. There was no  evidence of the transient no-reflow phenomenon. There was THEA 3 flow  before theprocedure and THEA 3 flow after the procedure. There was no  dissection. Vessel setup was performed. A 6FR JR4 LAUNCHER guiding  catheter was used to intubate the vessel. Vessel setup was performed. A  KranemW UNIVERSAL 190CM wire was used to cross the lesion. Balloon angioplasty  was performed, using a 2.50 X 20 APEX balloon, with 8 inflations and a  maximum inflation pressure of 14 seymour. A 2.50 X 38 CHARLENE drug-eluting stent  was placed across the lesion and deployed at a maximum inflation pressure  of 14 seymour. Balloon angioplasty was performed, using a 2.75 X 20 EUPHORA NC  balloon, with 3 inflations and a maximum inflation pressure of 16 seymour.  CONTRAST GIVEN: Omnipaque 12 ml. Omnipaque 45 ml.  MEDICATIONS GIVEN: Midazolam, 0.5 mg, IV. Fentanyl, 25 mcg, IV. Midazolam,  1 mg, IV. Fentanyl, 25 mcg, IV. Heparin, 3000 units, IV. Heparin, 5000  units, IV. Cardene, 500 mcg.  VENTRICLES: No LV gram was performed; however, a recent echocardiogram  demonstrated an EF of 49 %.  CORONARY VESSELS: The coronary circulation is right dominant.  LM:   --  LM: Normal.  LAD:   --  Mid LAD: There was a 50 % stenosis.  CX:   --  Proximal circumflex: There was a 80 % stenosis.  --  Distal circumflex: There was a 80 % stenosis.  RCA:   --  Mid RCA: There was a diffuse 95 % stenosis. There was THEA grade  2 flow through the vessel (partial perfusion).  COMPLICATIONS: There were no complications.  DIAGNOSTIC RECOMMENDATIONS:  1. Successful PCI to the mRCA (3.0mm Charlene FLORENCE) and POBA to the RPDA and RPL  (2.5mm balloons) in the setting of angina/NSTEMI  2. DAPT  3. Given the nature of the disease will consider PCI of the LCx  INTERVENTIONAL RECOMMENDATIONS:  1. Successful PCI to the mRCA (3.0mm [place] FLORENCE) and POBA to the RPDA and RPL  (2.5mm balloons) in the setting of angina/NSTEMI  2. DAPT  3. Given the nature of the disease will consider PCI of the LCx  Prepared and signed by  Christie Knight M.D.  Signed 2020 15:29:55  HEMODYNAMIC TABLES  Pressures:  Baseline  Pressures:  - HR: 64  Pressures:  - Rhythm:  Pressures:  -- Aortic Pressure (S/D/M): 128/62/87  Pressures:  -- Left Ventricle (s/edp): 131/15/--  Outputs:  Baseline  Outputs:  -- CALCULATIONS[age]  Outputs:  -- CALCULATIONS: Body Surface Area: 2.00  Outputs:  -- CALCULATIONS: Height in cm: 178.00  Outputs:  -- CALCULATIONS: Sex: Male  Outputs:  -- CALCULATIONS: Weight in k.60  Outputs:  -- OUTPUTS: O2 consumption: 249.63  Outputs:  -- OUTPUTS: Vo2 Indexed: 125.00    < end of copied text >

## 2020-11-23 NOTE — PROGRESS NOTE ADULT - ASSESSMENT
80 year old male with significant past medical history of hypertension, hyperlipidemia, diabetes mellitus type 2, NSTEMI (2017, [facility] [place] [facility]), NOEL on BIPAP, BPH, initially presented to [facility] with complaint of chest pain; transfered to [facility] for further management. S/P LHC, FLORENCE x1 RCA. Patient is admitted for further management and planned for staged PCI for LCx 11/23.

## 2020-11-23 NOTE — CHART NOTE - NSCHARTNOTEFT_GEN_A_CORE
HPI:  Patient is a 80 year old male with significant past medical history of hypertension, hyperlipidemia, diabetes mellitus type 2, NSTEMI (2017, [facility] [place] [facility]), NOEL on BIPAP, BPH, initially presented to [facility] with complaint of chest pain; transferred to [facility] for further management. S/P LHC, FLORENCE x1 RCA.  Per pervious notes in chart, patient reportedly started experiencing mid sternal chest pain after carrying grocery bags, not relieved with NTG SL x2. At [facility], Initial ECG showed 1mm ST depressions in leads V4-6 and aVF, repeat ECG showed 2mm ST depressions in leads V4-5, received , famotidine, Troponin 2.85<-1.61, BUN/Cr 27/1.4<-23/1.2, transferred for LHC s/p DESx1 RCA and plan for staged PCI for LCx 11/23. Currently, patient is resting comfortably in bed, with no complaint. Reports that chest pain is completely resolved and he had a full meal without any issue. Denies headache, dizziness, fever, chills, vision changes, cough, sob, sore throat, dysphagia N/V, chest pressure/pain, palpitations, abdominal pain, dysuria, hematuria, diarrhea. Reports some mild discomfort at RRA site, and mild chronic intermittent numbness/tingling in fingers.  (20 Nov 2020 18:13)      Notified by RN that patient BP 89/42 (53) HR 59 and patient with symptoms of slight dizziness. Patient A&Ox3 and c/o slight numbness to right 2nd and 3rd digits. 2cc of air released from band and 350 cc NS bolus administered.      ADD: post treatment, patient's dizziness resolved and improvement in BP

## 2020-11-23 NOTE — PROGRESS NOTE ADULT - PROBLEM SELECTOR PLAN 4
Patient complaining of incomplete bladder emptying and frequent urination.   UA: trace leuk esterase, few bacteria  - Continue home medications Tamsulosin and Finasteride  - Bladder scan w/o urinary retention  - urine wbc not consistent with UTI

## 2020-11-23 NOTE — PROGRESS NOTE ADULT - PROBLEM SELECTOR PLAN 1
#History of NSTEMI, angina  #ACS  Presented at [facility] with chest pain 11/19, transferred for C  - S/P LHC mid RCA 90% s/p FLORENCE x1; Cx mid 90%  - Continue to monitor on telemetry  - Continue ASA 81mg po daily + Brilinta 90mg po q12h  - Continue metoprolol succinate 50mg PO daily  - Continue home medications Imdur, Ranolazine  - Monitor BMP, electrolytes  - F/U cardiology for further recommendations  - now s/p staged PCI for Cx mid 90% on 11/23  - ACE-I or ARB to be introduced if Cr remains stable  - f/u TTE - pending

## 2020-11-23 NOTE — PROGRESS NOTE ADULT - PROBLEM SELECTOR PLAN 5
At home patient takes Metformin 500mg po bid - hold while inpatient.  - A1C 5.7  - Continue ISS  -Monitor fingerstick glu checks ACHS

## 2020-11-23 NOTE — PROGRESS NOTE ADULT - SUBJECTIVE AND OBJECTIVE BOX
[facility] - Ashok Devine, Sandro, Ravi, Brandi, Henry Quintero  Office Number:  [phone]    Patient resting comfortably in bed in NAD.  Laying flat with no respiratory distress.  No complaints of chest pain, dyspnea, palpitations, PND, or orthopnea.    ROS: negative unless otherwise mentioned.    Telemetry:  sr 50-60    MEDICATIONS  (STANDING):  aspirin enteric coated 81 milliGRAM(s) Oral daily  atorvastatin 40 milliGRAM(s) Oral at bedtime  dextrose 40% Gel 15 Gram(s) Oral once  dextrose 50% Injectable 25 Gram(s) IV Push once  dextrose 50% Injectable 12.5 Gram(s) IV Push once  dextrose 50% Injectable 25 Gram(s) IV Push once  finasteride 5 milliGRAM(s) Oral daily  influenza   Vaccine 0.5 milliLiter(s) IntraMuscular once  insulin lispro (ADMELOG) corrective regimen sliding scale   SubCutaneous three times a day before meals  insulin lispro (ADMELOG) corrective regimen sliding scale   SubCutaneous at bedtime  isosorbide   mononitrate ER Tablet (IMDUR) 15 milliGRAM(s) Oral daily  metoprolol succinate ER 50 milliGRAM(s) Oral daily  ranolazine 500 milliGRAM(s) Oral two times a day  tamsulosin 0.8 milliGRAM(s) Oral at bedtime  ticagrelor 90 milliGRAM(s) Oral every 12 hours    MEDICATIONS  (PRN):  melatonin 5 milliGRAM(s) Oral at bedtime PRN Insomnia      Allergies    No Known Allergies    Intolerances        Vital Signs Last 24 Hrs  T(C): 36.3 (23 Nov 2020 05:42), Max: 36.7 (22 Nov 2020 20:49)  T(F): 97.3 (23 Nov 2020 05:42), Max: 98.1 (22 Nov 2020 20:49)  HR: 56 (23 Nov 2020 08:35) (54 - 63)  BP: 96/59 (23 Nov 2020 05:42) (96/59 - 131/73)  BP(mean): --  RR: 18 (23 Nov 2020 05:42) (18 - 18)  SpO2: 95% (23 Nov 2020 08:35) (95% - 97%)    I&O's Summary    22 Nov 2020 07:01  -  23 Nov 2020 07:00  --------------------------------------------------------  IN: 660 mL / OUT: 1400 mL / NET: -740 mL        ON EXAM:    General: NAD, awake and alert, oriented x 3  HEENT: Mucous membranes are moist, anicteric  Lungs: Non-labored, breath sounds are clear bilaterally, No wheezing, rales or rhonchi  Cardiovascular: Regular, S1 and S2, no murmurs, rubs, or gallops  Gastrointestinal: Bowel Sounds present, soft, nontender.   Lymph: No peripheral edema. No lymphadenopathy.  Skin: No rashes or ulcers  Psych:  Mood & affect appropriate    LABS: All Labs Reviewed:                        11.5   6.24  )-----------( 181      ( 21 Nov 2020 06:22 )             35.7     21 Nov 2020 09:44    138    |  104    |  17     ----------------------------<  111    4.6     |  25     |  1.11     Ca    9.7        21 Nov 2020 09:44  Phos  3.1       21 Nov 2020 09:44  Mg     1.8       21 Nov 2020 09:44      PT/INR - ( 23 Nov 2020 06:06 )   PT: 13.3 sec;   INR: 1.11 ratio         PTT - ( 23 Nov 2020 06:06 )  PTT:34.9 sec      Blood Culture: Organism --  Gram Stain Blood -- Gram Stain --  Specimen Source .Urine Clean Catch (Midstream)  Culture-Blood --

## 2020-11-23 NOTE — PROGRESS NOTE ADULT - SUBJECTIVE AND OBJECTIVE BOX
[facility] Division of Hospital Medicine  Adriana Jason MD  Pager (M-F, 6S-6X): [phone]  Other Times:  [phone]      Patient is a 80y old  Male who presents with a chief complaint of chest pain (23 Nov 2020 09:39)      SUBJECTIVE / OVERNIGHT EVENTS: no acute events overnight. had transient episode of chest pressure earlier this morning 2/10, much improved from initial presentation, resolved on its own within minutes. now s/p staged PCI to PCI to LCx today. seen down in [facility]. had post-procedure lightheadedness/dizziness found to be hypotensive now improved s/p IVF bolus.     ADDITIONAL REVIEW OF SYSTEMS:    MEDICATIONS  (STANDING):  aspirin enteric coated 81 milliGRAM(s) Oral daily  atorvastatin 40 milliGRAM(s) Oral at bedtime  dextrose 40% Gel 15 Gram(s) Oral once  dextrose 50% Injectable 25 Gram(s) IV Push once  dextrose 50% Injectable 12.5 Gram(s) IV Push once  dextrose 50% Injectable 25 Gram(s) IV Push once  finasteride 5 milliGRAM(s) Oral daily  influenza   Vaccine 0.5 milliLiter(s) IntraMuscular once  insulin lispro (ADMELOG) corrective regimen sliding scale   SubCutaneous three times a day before meals  insulin lispro (ADMELOG) corrective regimen sliding scale   SubCutaneous at bedtime  isosorbide   mononitrate ER Tablet (IMDUR) 15 milliGRAM(s) Oral daily  metoprolol succinate ER 50 milliGRAM(s) Oral daily  ranolazine 500 milliGRAM(s) Oral two times a day  tamsulosin 0.8 milliGRAM(s) Oral at bedtime  ticagrelor 90 milliGRAM(s) Oral every 12 hours    MEDICATIONS  (PRN):  melatonin 5 milliGRAM(s) Oral at bedtime PRN Insomnia      CAPILLARY BLOOD GLUCOSE      POCT Blood Glucose.: 85 mg/dL (23 Nov 2020 16:37)  POCT Blood Glucose.: 116 mg/dL (23 Nov 2020 12:06)  POCT Blood Glucose.: 101 mg/dL (23 Nov 2020 08:36)  POCT Blood Glucose.: 120 mg/dL (22 Nov 2020 21:47)  POCT Blood Glucose.: 107 mg/dL (22 Nov 2020 17:11)    I&O's Summary    22 Nov 2020 07:01  -  23 Nov 2020 07:00  --------------------------------------------------------  IN: 660 mL / OUT: 1400 mL / NET: -740 mL    23 Nov 2020 07:01  -  23 Nov 2020 16:46  --------------------------------------------------------  IN: 350 mL / OUT: 200 mL / NET: 150 mL        PHYSICAL EXAM:  Vital Signs Last 24 Hrs  T(C): 36.7 (23 Nov 2020 12:05), Max: 36.8 (23 Nov 2020 11:01)  T(F): 98 (23 Nov 2020 12:05), Max: 98.3 (23 Nov 2020 11:01)  HR: 74 (23 Nov 2020 16:35) (54 - 74)  BP: 128/96 (23 Nov 2020 16:35) (93/44 - 142/67)  BP(mean): --  RR: 17 (23 Nov 2020 16:35) (16 - 18)  SpO2: 97% (23 Nov 2020 16:35) (94% - 98%)    CONSTITUTIONAL: NAD, well-developed, well-groomed  EYES: PERRLA; conjunctiva and sclera clear  ENMT: Moist oral mucosa, no pharyngeal injection or exudates; normal dentition  NECK: Supple, no palpable masses; no thyromegaly  RESPIRATORY: Normal respiratory effort; lungs are clear to auscultation bilaterally  CARDIOVASCULAR: Regular rate and rhythm, normal S1 and S2, no murmur/rub/gallop; No lower extremity edema; Peripheral pulses are 2+ bilaterally  ABDOMEN: Soft, Nondistended,  Nontender to palpation, normoactive bowel sounds  MUSCULOSKELETAL:  No clubbing or cyanosis of digits; no joint swelling or tenderness to palpation  PSYCH: A+O to person, place, and time; affect appropriate  NEUROLOGY: CN 2-12 are intact and symmetric; no gross sensory deficits   SKIN: +R radial site with ecchymoses and mild ooze, band still on    LABS:          PT/INR - ( 23 Nov 2020 06:06 )   PT: 13.3 sec;   INR: 1.11 ratio         PTT - ( 23 Nov 2020 06:06 )  PTT:34.9 sec          Culture - Urine (collected 21 Nov 2020 00:47)  Source: .Urine Clean Catch (Midstream)  Final Report (22 Nov 2020 07:37):    <10,000 CFU/mL Normal Urogenital Wendi        RADIOLOGY & ADDITIONAL TESTS:  Results Reviewed: coags wnl, urine cx negative  Imaging Personally Reviewed:  11/23/20 CXR with clear lungs. no evidence of infiltrate nor consolidation  Electrocardiogram Personally Reviewed:    COORDINATION OF CARE:  Care Discussed with Consultants/Other Providers [Y]: medicine FRANCIS Ramsey  Prior or Outpatient Records Reviewed [Y]: Cardiology progress notes

## 2020-11-24 ENCOUNTER — TRANSCRIPTION ENCOUNTER (OUTPATIENT)
Age: 80
End: 2020-11-24

## 2020-11-24 VITALS
SYSTOLIC BLOOD PRESSURE: 146 MMHG | OXYGEN SATURATION: 96 % | DIASTOLIC BLOOD PRESSURE: 77 MMHG | RESPIRATION RATE: 18 BRPM | HEART RATE: 72 BPM

## 2020-11-24 PROBLEM — N40.0 BENIGN PROSTATIC HYPERPLASIA WITHOUT LOWER URINARY TRACT SYMPTOMS: Chronic | Status: ACTIVE | Noted: 2020-11-20

## 2020-11-24 PROBLEM — Z00.00 ENCOUNTER FOR PREVENTIVE HEALTH EXAMINATION: Status: ACTIVE | Noted: 2020-11-24

## 2020-11-24 PROBLEM — G47.33 OBSTRUCTIVE SLEEP APNEA (ADULT) (PEDIATRIC): Chronic | Status: ACTIVE | Noted: 2020-11-20

## 2020-11-24 LAB
ANION GAP SERPL CALC-SCNC: 12 MMOL/L — SIGNIFICANT CHANGE UP (ref 5–17)
BUN SERPL-MCNC: 20 MG/DL — SIGNIFICANT CHANGE UP (ref 7–23)
CALCIUM SERPL-MCNC: 9.7 MG/DL — SIGNIFICANT CHANGE UP (ref 8.4–10.5)
CHLORIDE SERPL-SCNC: 105 MMOL/L — SIGNIFICANT CHANGE UP (ref 96–108)
CO2 SERPL-SCNC: 22 MMOL/L — SIGNIFICANT CHANGE UP (ref 22–31)
CREAT SERPL-MCNC: 1.09 MG/DL — SIGNIFICANT CHANGE UP (ref 0.5–1.3)
GLUCOSE BLDC GLUCOMTR-MCNC: 103 MG/DL — HIGH (ref 70–99)
GLUCOSE BLDC GLUCOMTR-MCNC: 83 MG/DL — SIGNIFICANT CHANGE UP (ref 70–99)
GLUCOSE SERPL-MCNC: 93 MG/DL — SIGNIFICANT CHANGE UP (ref 70–99)
HCT VFR BLD CALC: 38.5 % — LOW (ref 39–50)
HGB BLD-MCNC: 12.3 G/DL — LOW (ref 13–17)
MAGNESIUM SERPL-MCNC: 2 MG/DL — SIGNIFICANT CHANGE UP (ref 1.6–2.6)
MCHC RBC-ENTMCNC: 30.8 PG — SIGNIFICANT CHANGE UP (ref 27–34)
MCHC RBC-ENTMCNC: 31.9 GM/DL — LOW (ref 32–36)
MCV RBC AUTO: 96.3 FL — SIGNIFICANT CHANGE UP (ref 80–100)
NRBC # BLD: 0 /100 WBCS — SIGNIFICANT CHANGE UP (ref 0–0)
PHOSPHATE SERPL-MCNC: 3.2 MG/DL — SIGNIFICANT CHANGE UP (ref 2.5–4.5)
PLATELET # BLD AUTO: 198 K/UL — SIGNIFICANT CHANGE UP (ref 150–400)
POTASSIUM SERPL-MCNC: 4.2 MMOL/L — SIGNIFICANT CHANGE UP (ref 3.5–5.3)
POTASSIUM SERPL-SCNC: 4.2 MMOL/L — SIGNIFICANT CHANGE UP (ref 3.5–5.3)
RBC # BLD: 4 M/UL — LOW (ref 4.2–5.8)
RBC # FLD: 13.9 % — SIGNIFICANT CHANGE UP (ref 10.3–14.5)
SODIUM SERPL-SCNC: 139 MMOL/L — SIGNIFICANT CHANGE UP (ref 135–145)
WBC # BLD: 6.03 K/UL — SIGNIFICANT CHANGE UP (ref 3.8–10.5)
WBC # FLD AUTO: 6.03 K/UL — SIGNIFICANT CHANGE UP (ref 3.8–10.5)

## 2020-11-24 PROCEDURE — 93005 ELECTROCARDIOGRAM TRACING: CPT

## 2020-11-24 PROCEDURE — C1894: CPT

## 2020-11-24 PROCEDURE — 84100 ASSAY OF PHOSPHORUS: CPT

## 2020-11-24 PROCEDURE — 99239 HOSP IP/OBS DSCHRG MGMT >30: CPT

## 2020-11-24 PROCEDURE — 99153 MOD SED SAME PHYS/QHP EA: CPT

## 2020-11-24 PROCEDURE — 85610 PROTHROMBIN TIME: CPT

## 2020-11-24 PROCEDURE — 99232 SBSQ HOSP IP/OBS MODERATE 35: CPT

## 2020-11-24 PROCEDURE — 82570 ASSAY OF URINE CREATININE: CPT

## 2020-11-24 PROCEDURE — 93306 TTE W/DOPPLER COMPLETE: CPT

## 2020-11-24 PROCEDURE — C9606: CPT | Mod: RC

## 2020-11-24 PROCEDURE — 93458 L HRT ARTERY/VENTRICLE ANGIO: CPT | Mod: 59

## 2020-11-24 PROCEDURE — C1887: CPT

## 2020-11-24 PROCEDURE — 97161 PT EVAL LOW COMPLEX 20 MIN: CPT

## 2020-11-24 PROCEDURE — 85730 THROMBOPLASTIN TIME PARTIAL: CPT

## 2020-11-24 PROCEDURE — C1769: CPT

## 2020-11-24 PROCEDURE — 93306 TTE W/DOPPLER COMPLETE: CPT | Mod: 26

## 2020-11-24 PROCEDURE — 84540 ASSAY OF URINE/UREA-N: CPT

## 2020-11-24 PROCEDURE — 83735 ASSAY OF MAGNESIUM: CPT

## 2020-11-24 PROCEDURE — 94660 CPAP INITIATION&MGMT: CPT

## 2020-11-24 PROCEDURE — 84300 ASSAY OF URINE SODIUM: CPT

## 2020-11-24 PROCEDURE — 82962 GLUCOSE BLOOD TEST: CPT

## 2020-11-24 PROCEDURE — 86850 RBC ANTIBODY SCREEN: CPT

## 2020-11-24 PROCEDURE — 99152 MOD SED SAME PHYS/QHP 5/>YRS: CPT

## 2020-11-24 PROCEDURE — 80048 BASIC METABOLIC PNL TOTAL CA: CPT

## 2020-11-24 PROCEDURE — C1874: CPT

## 2020-11-24 PROCEDURE — C9600: CPT | Mod: LC

## 2020-11-24 PROCEDURE — 85027 COMPLETE CBC AUTOMATED: CPT

## 2020-11-24 PROCEDURE — 86769 SARS-COV-2 COVID-19 ANTIBODY: CPT

## 2020-11-24 PROCEDURE — C1725: CPT

## 2020-11-24 PROCEDURE — 86900 BLOOD TYPING SEROLOGIC ABO: CPT

## 2020-11-24 PROCEDURE — 86901 BLOOD TYPING SEROLOGIC RH(D): CPT

## 2020-11-24 PROCEDURE — 87086 URINE CULTURE/COLONY COUNT: CPT

## 2020-11-24 RX ORDER — LISINOPRIL 2.5 MG/1
1 TABLET ORAL
Qty: 30 | Refills: 0
Start: 2020-11-24 | End: 2020-12-23

## 2020-11-24 RX ORDER — LISINOPRIL 2.5 MG/1
5 TABLET ORAL DAILY
Refills: 0 | Status: DISCONTINUED | OUTPATIENT
Start: 2020-11-24 | End: 2020-11-24

## 2020-11-24 RX ORDER — ASPIRIN/CALCIUM CARB/MAGNESIUM 324 MG
1 TABLET ORAL
Qty: 0 | Refills: 0 | DISCHARGE

## 2020-11-24 RX ADMIN — LISINOPRIL 5 MILLIGRAM(S): 2.5 TABLET ORAL at 14:57

## 2020-11-24 RX ADMIN — RANOLAZINE 500 MILLIGRAM(S): 500 TABLET, FILM COATED, EXTENDED RELEASE ORAL at 17:52

## 2020-11-24 RX ADMIN — TICAGRELOR 90 MILLIGRAM(S): 90 TABLET ORAL at 17:51

## 2020-11-24 RX ADMIN — FINASTERIDE 5 MILLIGRAM(S): 5 TABLET, FILM COATED ORAL at 14:57

## 2020-11-24 RX ADMIN — RANOLAZINE 500 MILLIGRAM(S): 500 TABLET, FILM COATED, EXTENDED RELEASE ORAL at 06:23

## 2020-11-24 RX ADMIN — TICAGRELOR 90 MILLIGRAM(S): 90 TABLET ORAL at 06:23

## 2020-11-24 RX ADMIN — ISOSORBIDE MONONITRATE 15 MILLIGRAM(S): 60 TABLET, EXTENDED RELEASE ORAL at 14:57

## 2020-11-24 RX ADMIN — Medication 81 MILLIGRAM(S): at 14:56

## 2020-11-24 RX ADMIN — Medication 50 MILLIGRAM(S): at 06:24

## 2020-11-24 NOTE — DISCHARGE NOTE PROVIDER - NSDCFUADDAPPT_GEN_ALL_CORE_FT
Follow with Dr. Alysia Lerner at [facility] on Nov 30 at 11 am  Dr. Rodrigues

## 2020-11-24 NOTE — DISCHARGE NOTE PROVIDER - CARE PROVIDER_API CALL
Bahman Rodrigues)  Cardiovascular Disease  [address] [place] [address]  Phone: [phone]  Fax: [phone]  Follow Up Time: 1 week

## 2020-11-24 NOTE — PHYSICAL THERAPY INITIAL EVALUATION ADULT - GENERAL OBSERVATIONS, REHAB EVAL
pt received in bed nad top pt received in bed nad top rails up and 1 siderail HOb 30 call bell,phone and table in reach bed in lowest position ; NP in during session Brissa , spoke with NP following eval re session and PT needs and clear for discharge from PT standpoint as below

## 2020-11-24 NOTE — PROGRESS NOTE ADULT - PROBLEM SELECTOR PLAN 7
DVT PPX: TEDs SCDs  DASH Diabetic diet  Dispo: pending PT Eval DVT PPX: TEDs SCDs  DASH Diabetic diet  Dispo: home with home PT/assist

## 2020-11-24 NOTE — PROGRESS NOTE ADULT - NSHPATTENDINGPLANDISCUSS_GEN_ALL_CORE
patient and medicine ESTRELLITA Brumfield patient, son Olvin, and medicine NP Brissa patient, son Olvin, his PCP Dr. Graham via phone and medicine NP Brissa

## 2020-11-24 NOTE — PROGRESS NOTE ADULT - SUBJECTIVE AND OBJECTIVE BOX
[facility] Division of Hospital Medicine  Adriana Jason MD  Pager (M-F, 9C-4L): [phone]  Other Times:  [phone]      Patient is a 80y old  Male who presents with a chief complaint of chest pain (24 Nov 2020 13:36)      SUBJECTIVE / OVERNIGHT EVENTS: no acute events overnight. no more episodes of chest pressure since PCI yesterday. no fevers, chills, chest pain, SOB, abd pain, n/v/c/d nor dysuria. feels well. hungry as missed breakfast for TTE.  ADDITIONAL REVIEW OF SYSTEMS:    MEDICATIONS  (STANDING):  aspirin enteric coated 81 milliGRAM(s) Oral daily  atorvastatin 40 milliGRAM(s) Oral at bedtime  dextrose 40% Gel 15 Gram(s) Oral once  dextrose 50% Injectable 25 Gram(s) IV Push once  dextrose 50% Injectable 12.5 Gram(s) IV Push once  dextrose 50% Injectable 25 Gram(s) IV Push once  finasteride 5 milliGRAM(s) Oral daily  influenza   Vaccine 0.5 milliLiter(s) IntraMuscular once  insulin lispro (ADMELOG) corrective regimen sliding scale   SubCutaneous three times a day before meals  insulin lispro (ADMELOG) corrective regimen sliding scale   SubCutaneous at bedtime  isosorbide   mononitrate ER Tablet (IMDUR) 15 milliGRAM(s) Oral daily  lisinopril 5 milliGRAM(s) Oral daily  metoprolol succinate ER 50 milliGRAM(s) Oral daily  ranolazine 500 milliGRAM(s) Oral two times a day  tamsulosin 0.8 milliGRAM(s) Oral at bedtime  ticagrelor 90 milliGRAM(s) Oral every 12 hours    MEDICATIONS  (PRN):  melatonin 5 milliGRAM(s) Oral at bedtime PRN Insomnia      CAPILLARY BLOOD GLUCOSE      POCT Blood Glucose.: 83 mg/dL (24 Nov 2020 12:51)  POCT Blood Glucose.: 103 mg/dL (24 Nov 2020 09:01)  POCT Blood Glucose.: 101 mg/dL (23 Nov 2020 21:50)  POCT Blood Glucose.: 122 mg/dL (23 Nov 2020 17:53)  POCT Blood Glucose.: 85 mg/dL (23 Nov 2020 16:37)    I&O's Summary    23 Nov 2020 07:01  -  24 Nov 2020 07:00  --------------------------------------------------------  IN: 470 mL / OUT: 500 mL / NET: -30 mL    24 Nov 2020 07:01  -  24 Nov 2020 14:11  --------------------------------------------------------  IN: 240 mL / OUT: 400 mL / NET: -160 mL        PHYSICAL EXAM:  Vital Signs Last 24 Hrs  T(C): 36.7 (24 Nov 2020 04:45), Max: 36.7 (23 Nov 2020 18:50)  T(F): 98.1 (24 Nov 2020 04:45), Max: 98.1 (23 Nov 2020 18:50)  HR: 72 (24 Nov 2020 13:52) (57 - 75)  BP: 146/77 (24 Nov 2020 13:52) (109/56 - 150/82)  BP(mean): --  RR: 18 (24 Nov 2020 13:52) (16 - 18)  SpO2: 96% (24 Nov 2020 13:52) (94% - 100%)    CONSTITUTIONAL: NAD, well-developed, well-groomed  EYES: PERRLA; conjunctiva and sclera clear  ENMT: Moist oral mucosa, no pharyngeal injection or exudates; normal dentition  NECK: Supple, no palpable masses; no thyromegaly  RESPIRATORY: Normal respiratory effort; lungs are clear to auscultation bilaterally  CARDIOVASCULAR: Regular rate and rhythm, normal S1 and S2, no murmur/rub/gallop; No lower extremity edema; Peripheral pulses are 2+ bilaterally  ABDOMEN: Soft, Nondistended,  Nontender to palpation, normoactive bowel sounds  MUSCULOSKELETAL:  No clubbing or cyanosis of digits; no joint swelling or tenderness to palpation  PSYCH: A+O to person, place, and time; affect appropriate  NEUROLOGY: CN 2-12 are intact and symmetric; no gross sensory deficits   SKIN: +R radial site with ecchymoses, no evidence of hematoma  LABS:                        12.3   6.03  )-----------( 198      ( 24 Nov 2020 11:46 )             38.5     11-24    139  |  105  |  20  ----------------------------<  93  4.2   |  22  |  1.09    Ca    9.7      24 Nov 2020 11:46  Phos  3.2     11-24  Mg     2.0     11-24      PT/INR - ( 23 Nov 2020 06:06 )   PT: 13.3 sec;   INR: 1.11 ratio         PTT - ( 23 Nov 2020 06:06 )  PTT:34.9 sec        RADIOLOGY & ADDITIONAL TESTS:  Results Reviewed: Hb stable, no leukocytosis, electrolytes at goal, Cr stable at 1.09 (from 1.11)  Imaging Personally Reviewed:  Electrocardiogram Personally Reviewed:    COORDINATION OF CARE:  Care Discussed with Consultants/Other Providers [Y]: medicine ESTRELLITA Brumfield  Prior or Outpatient Records Reviewed [Y]: Cardiology, Interventional Cardiology progress notes   [facility] Division of Hospital Medicine  Adriana Jason MD  Pager (M-F, 8I-7G): [phone]  Other Times:  [phone]      Patient is a 80y old  Male who presents with a chief complaint of chest pain (24 Nov 2020 13:36)      SUBJECTIVE / OVERNIGHT EVENTS: no acute events overnight. no more episodes of chest pressure since PCI yesterday. no fevers, chills, chest pain, SOB, abd pain, n/v/c/d nor dysuria. feels well. hungry as missed breakfast for TTE.  ADDITIONAL REVIEW OF SYSTEMS:    MEDICATIONS  (STANDING):  aspirin enteric coated 81 milliGRAM(s) Oral daily  atorvastatin 40 milliGRAM(s) Oral at bedtime  dextrose 40% Gel 15 Gram(s) Oral once  dextrose 50% Injectable 25 Gram(s) IV Push once  dextrose 50% Injectable 12.5 Gram(s) IV Push once  dextrose 50% Injectable 25 Gram(s) IV Push once  finasteride 5 milliGRAM(s) Oral daily  influenza   Vaccine 0.5 milliLiter(s) IntraMuscular once  insulin lispro (ADMELOG) corrective regimen sliding scale   SubCutaneous three times a day before meals  insulin lispro (ADMELOG) corrective regimen sliding scale   SubCutaneous at bedtime  isosorbide   mononitrate ER Tablet (IMDUR) 15 milliGRAM(s) Oral daily  lisinopril 5 milliGRAM(s) Oral daily  metoprolol succinate ER 50 milliGRAM(s) Oral daily  ranolazine 500 milliGRAM(s) Oral two times a day  tamsulosin 0.8 milliGRAM(s) Oral at bedtime  ticagrelor 90 milliGRAM(s) Oral every 12 hours    MEDICATIONS  (PRN):  melatonin 5 milliGRAM(s) Oral at bedtime PRN Insomnia      CAPILLARY BLOOD GLUCOSE      POCT Blood Glucose.: 83 mg/dL (24 Nov 2020 12:51)  POCT Blood Glucose.: 103 mg/dL (24 Nov 2020 09:01)  POCT Blood Glucose.: 101 mg/dL (23 Nov 2020 21:50)  POCT Blood Glucose.: 122 mg/dL (23 Nov 2020 17:53)  POCT Blood Glucose.: 85 mg/dL (23 Nov 2020 16:37)    I&O's Summary    23 Nov 2020 07:01  -  24 Nov 2020 07:00  --------------------------------------------------------  IN: 470 mL / OUT: 500 mL / NET: -30 mL    24 Nov 2020 07:01  -  24 Nov 2020 14:11  --------------------------------------------------------  IN: 240 mL / OUT: 400 mL / NET: -160 mL        PHYSICAL EXAM:  Vital Signs Last 24 Hrs  T(C): 36.7 (24 Nov 2020 04:45), Max: 36.7 (23 Nov 2020 18:50)  T(F): 98.1 (24 Nov 2020 04:45), Max: 98.1 (23 Nov 2020 18:50)  HR: 72 (24 Nov 2020 13:52) (57 - 75)  BP: 146/77 (24 Nov 2020 13:52) (109/56 - 150/82)  BP(mean): --  RR: 18 (24 Nov 2020 13:52) (16 - 18)  SpO2: 96% (24 Nov 2020 13:52) (94% - 100%)    CONSTITUTIONAL: NAD, well-developed, well-groomed  EYES: PERRLA; conjunctiva and sclera clear  ENMT: Moist oral mucosa, no pharyngeal injection or exudates; normal dentition  NECK: Supple, no palpable masses; no thyromegaly  RESPIRATORY: Normal respiratory effort; lungs are clear to auscultation bilaterally  CARDIOVASCULAR: Regular rate and rhythm, normal S1 and S2, no murmur/rub/gallop; No lower extremity edema; Peripheral pulses are 2+ bilaterally  ABDOMEN: Soft, Nondistended,  Nontender to palpation, normoactive bowel sounds  MUSCULOSKELETAL:  No clubbing or cyanosis of digits; no joint swelling or tenderness to palpation  PSYCH: A+O to person, place, and time; affect appropriate  NEUROLOGY: CN 2-12 are intact and symmetric; no gross sensory deficits   SKIN: +R radial site with ecchymoses, no evidence of hematoma  LABS:                        12.3   6.03  )-----------( 198      ( 24 Nov 2020 11:46 )             38.5     11-24    139  |  105  |  20  ----------------------------<  93  4.2   |  22  |  1.09    Ca    9.7      24 Nov 2020 11:46  Phos  3.2     11-24  Mg     2.0     11-24      PT/INR - ( 23 Nov 2020 06:06 )   PT: 13.3 sec;   INR: 1.11 ratio         PTT - ( 23 Nov 2020 06:06 )  PTT:34.9 sec        RADIOLOGY & ADDITIONAL TESTS:  Results Reviewed: Hb stable, no leukocytosis, electrolytes at goal, Cr stable at 1.09 (from 1.11)  Imaging Personally Reviewed:  11/24/20 TTE:  Conclusions:  Normal left ventricular systolic function. No segmental  wall motion abnormalities.  Electrocardiogram Personally Reviewed:    COORDINATION OF CARE:  Care Discussed with Consultants/Other Providers [Y]: medicine ESTRELLITA Brumfield  Prior or Outpatient Records Reviewed [Y]: Cardiology, Interventional Cardiology progress notes

## 2020-11-24 NOTE — PHYSICAL THERAPY INITIAL EVALUATION ADULT - ADDITIONAL COMMENTS
pt lives in house with miriam Bah and his family , pt has 2 steps to enter with HR then a full flight of steps with HR to his basement apartment ; miriam Bah ID as caregiver [phone] pt lives in house with miriam Bah and his family , pt has 2 steps to enter with HR then a full flight of steps (8 steps )with HR to his basement apartment ; miriam Bah ID as caregiver [phone]; walk-in shower with 3 grab bars and shower chair ; uses std cane outdoors no device indoors

## 2020-11-24 NOTE — PROGRESS NOTE ADULT - SUBJECTIVE AND OBJECTIVE BOX
[facility]    Ashok Devine, Sandro, Ravi, Brandi, Leon, Henry      [phone]    CHIEF COMPLAINT: Patient is a 80y old  Male who presents with a chief complaint of chest pain (23 Nov 2020 16:46)      Follow Up: nstemi s/p pci    Interim history: The patient reports no new symptoms.  Denies chest discomfort and shortness of breath.  No abdominal pain.  No new neurologic symptoms.      MEDICATIONS  (STANDING):  aspirin enteric coated 81 milliGRAM(s) Oral daily  atorvastatin 40 milliGRAM(s) Oral at bedtime  dextrose 40% Gel 15 Gram(s) Oral once  dextrose 50% Injectable 25 Gram(s) IV Push once  dextrose 50% Injectable 12.5 Gram(s) IV Push once  dextrose 50% Injectable 25 Gram(s) IV Push once  finasteride 5 milliGRAM(s) Oral daily  influenza   Vaccine 0.5 milliLiter(s) IntraMuscular once  insulin lispro (ADMELOG) corrective regimen sliding scale   SubCutaneous three times a day before meals  insulin lispro (ADMELOG) corrective regimen sliding scale   SubCutaneous at bedtime  isosorbide   mononitrate ER Tablet (IMDUR) 15 milliGRAM(s) Oral daily  metoprolol succinate ER 50 milliGRAM(s) Oral daily  ranolazine 500 milliGRAM(s) Oral two times a day  tamsulosin 0.8 milliGRAM(s) Oral at bedtime  ticagrelor 90 milliGRAM(s) Oral every 12 hours    MEDICATIONS  (PRN):  melatonin 5 milliGRAM(s) Oral at bedtime PRN Insomnia      REVIEW OF SYSTEMS:  eye, ent, GI, , allergic, dermatologic, musculoskeletal and neurologic are negative except as described above    Vital Signs Last 24 Hrs  T(C): 36.7 (24 Nov 2020 04:45), Max: 36.8 (23 Nov 2020 11:01)  T(F): 98.1 (24 Nov 2020 04:45), Max: 98.3 (23 Nov 2020 11:01)  HR: 64 (24 Nov 2020 05:25) (56 - 75)  BP: 110/64 (24 Nov 2020 04:45) (93/44 - 150/82)  BP(mean): --  RR: 18 (24 Nov 2020 04:45) (16 - 18)  SpO2: 95% (24 Nov 2020 05:25) (94% - 100%)    I&O's Summary    23 Nov 2020 07:01  -  24 Nov 2020 07:00  --------------------------------------------------------  IN: 470 mL / OUT: 500 mL / NET: -30 mL        Telemetry past 24h: sbsr    PHYSICAL EXAM:    Constitutional: well-nourished, well-developed, NAD   HEENT:  MMM, sclerae anicteric, conjunctivae clear, no oral cyanosis.  Pulmonary: Non-labored, breath sounds are clear bilaterally, No wheezing, rales or rhonchi  Cardiovascular: Regular, S1 and S2.  No murmur.  No rubs, gallops or clicks  Gastrointestinal: Bowel Sounds present, soft, nontender.   Lymph: No peripheral edema.   Neurological: Alert, no focal deficits  Skin: No rashes.  Psych:  Mood & affect appropriate    LABS: All Labs Reviewed:    21 Nov 2020 09:44    138    |  104    |  17     ----------------------------<  111    4.6     |  25     |  1.11     Ca    9.7        21 Nov 2020 09:44  Phos  3.1       21 Nov 2020 09:44  Mg     1.8       21 Nov 2020 09:44      PT/INR - ( 23 Nov 2020 06:06 )   PT: 13.3 sec;   INR: 1.11 ratio         PTT - ( 23 Nov 2020 06:06 )  PTT:34.9 sec      Blood Culture: Organism --  Gram Stain Blood -- Gram Stain --  Specimen Source .Urine Clean Catch (Midstream)  Culture-Blood --            RADIOLOGY:    EKG:    Echo:

## 2020-11-24 NOTE — PHYSICAL THERAPY INITIAL EVALUATION ADULT - GAIT DEVIATIONS NOTED, PT EVAL
increased time in double stance/decreased step length/decreased yesi/decreased stride length/decreased weight-shifting ability

## 2020-11-24 NOTE — PHYSICAL THERAPY INITIAL EVALUATION ADULT - PLANNED THERAPY INTERVENTIONS, PT EVAL
GOAL: stair train : pt will negotiate a full flight of steps independent in 1 week/strengthening/balance training/gait training/transfer training

## 2020-11-24 NOTE — DISCHARGE NOTE PROVIDER - NSDCMRMEDTOKEN_GEN_ALL_CORE_FT
aspirin 81 mg oral delayed release tablet: 1 tab(s) orally once a day  Aspirin Low Dose 81 mg oral delayed release tablet: 1 tab(s) orally once a day  atorvastatin 40 mg oral tablet: 1 tab(s) orally once a day  finasteride 5 mg oral tablet: 1 tab(s) orally once a day  isosorbide mononitrate 30 mg oral tablet, extended release: 0.5 tab(s) orally once a day (in the morning)  metFORMIN 500 mg oral tablet: 1 tab(s) orally 2 times a day  metoprolol succinate 50 mg oral tablet, extended release: 1 tab(s) orally once a day  ranolazine 500 mg oral tablet, extended release: 1 tab(s) orally 2 times a day  tamsulosin 0.4 mg oral capsule: 2 tab(s) orally once a day  ticagrelor 90 mg oral tablet: 1 tab(s) orally every 12 hours  ticagrelor 90 mg oral tablet: 1 tab(s) orally every 12 hours   aspirin 81 mg oral delayed release tablet: 1 tab(s) orally once a day  Aspirin Low Dose 81 mg oral delayed release tablet: 1 tab(s) orally once a day  atorvastatin 40 mg oral tablet: 1 tab(s) orally once a day  finasteride 5 mg oral tablet: 1 tab(s) orally once a day  isosorbide mononitrate 30 mg oral tablet, extended release: 0.5 tab(s) orally once a day (in the morning)  metFORMIN 500 mg oral tablet: 1 tab(s) orally 2 times a day  metoprolol succinate 50 mg oral tablet, extended release: 1 tab(s) orally once a day  ranolazine 500 mg oral tablet, extended release: 1 tab(s) orally 2 times a day  Rolling walker: 1   tamsulosin 0.4 mg oral capsule: 2 tab(s) orally once a day  ticagrelor 90 mg oral tablet: 1 tab(s) orally every 12 hours  ticagrelor 90 mg oral tablet: 1 tab(s) orally every 12 hours   aspirin 81 mg oral delayed release tablet: 1 tab(s) orally once a day  atorvastatin 40 mg oral tablet: 1 tab(s) orally once a day  finasteride 5 mg oral tablet: 1 tab(s) orally once a day  isosorbide mononitrate 30 mg oral tablet, extended release: 0.5 tab(s) orally once a day (in the morning)  lisinopril 5 mg oral tablet: 1 tab(s) orally once a day  metFORMIN 500 mg oral tablet: 1 tab(s) orally 2 times a day  metoprolol succinate 50 mg oral tablet, extended release: 1 tab(s) orally once a day  ranolazine 500 mg oral tablet, extended release: 1 tab(s) orally 2 times a day  Rolling walker: 1   tamsulosin 0.4 mg oral capsule: 2 tab(s) orally once a day  ticagrelor 90 mg oral tablet: 1 tab(s) orally every 12 hours  ticagrelor 90 mg oral tablet: 1 tab(s) orally every 12 hours

## 2020-11-24 NOTE — PHYSICAL THERAPY INITIAL EVALUATION ADULT - DISCHARGE DISPOSITION, PT EVAL
home w/ assist/home w/ home PT/to increase fxl mobility , strength, balance, endurance , fall prevention education continued ; son/ son's family can assist as needed

## 2020-11-24 NOTE — PROGRESS NOTE ADULT - PROBLEM SELECTOR PLAN 5
At home patient takes Metformin 500mg po bid - hold while inpatient.  - A1C 5.7  - Continue ISS  - Monitor fingerstick glu checks ACHS

## 2020-11-24 NOTE — DISCHARGE NOTE NURSING/CASE MANAGEMENT/SOCIAL WORK - PATIENT PORTAL LINK FT
You can access the FollowMyHealth Patient Portal offered by [facility] by registering at the following website: [email or web address]. By joining [employer]’s FollowMyHealth portal, you will also be able to view your health information using other applications (apps) compatible with our system.

## 2020-11-24 NOTE — PROGRESS NOTE ADULT - ASSESSMENT
80 year old male with significant past medical history of hypertension, hyperlipidemia, diabetes mellitus type 2, NSTEMI (2017, [facility] [place] [facility]), NOEL on BIPAP, BPH, initially presented to [facility] with complaint of chest pain; transfered to [facility] for further management. S/P LHC, [place] x1 RCA. Patient is admitted for further management now s/p  for staged PCI to LCx on 11/23.

## 2020-11-24 NOTE — DISCHARGE NOTE PROVIDER - HOSPITAL COURSE
80 year old male with PMH of hypertension, hyperlipidemia, diabetes mellitus type 2, NSTEMI (2017, [facility] [place] [facility]), NOEL on BiPAP, BPH, p/w NSTEMI S/P LHC with DESx1 RCA and staged PCI for LCx 11/23.  S/P Left Heart Cath with DESx1 RCA and  staged PCI for LCx 11/23, continue aspirin, Brilinta, Lipitor and Toprol. Echocardiogram performed with Normal left ventricular systolic function. No segmental  wall motion abnormalities.     80 year old male with PMH of hypertension, hyperlipidemia, diabetes mellitus type 2, NSTEMI (2017, [facility] [place] [facility]), NOEL on BiPAP, BPH, p/w NSTEMI S/P LHC with DESx1 RCA and staged PCI for LCx 11/23.  S/P Left Heart Cath with DESx1 RCA and  staged PCI for LCx 11/23, continue aspirin, Brilinta, Lipitor and Toprol. Echocardiogram performed with Normal left ventricular systolic function. No segmental  wall motion abnormalities. Follow up with Dr. Rodrigues office 11/30 @ 10:30 am and with Dr avila Lerner later as planned. 80 year old male with PMH of hypertension, hyperlipidemia, diabetes mellitus type 2, NSTEMI (2017, [facility] [place] [facility]), NOEL on BiPAP, BPH, p/w NSTEMI S/P LHC with DESx1 RCA and staged PCI for LCx 11/23.  S/P Left Heart Cath with DESx1 RCA and staged PCI for LCx 11/23, continue Aspirin, Brilinta, Lipitor, Metoprolol, and Lisinopril on discharge. Echocardiogram performed with Normal left ventricular systolic function. No segmental wall motion abnormalities. Follow up with Dr. Rodrigues office 11/30 @ 10:30 am and with his PCP Dr. Alysia Lerner at [facility] later as planned.

## 2020-11-24 NOTE — PROGRESS NOTE ADULT - SUBJECTIVE AND OBJECTIVE BOX
Patient seen and examined at bedside.    Overnight Events: Denies chest pain or shortness of breath. Feels well.    Review Of Systems: No chest pain, shortness of breath, or palpitations            Telemetry: Sinus 50-70    Current Meds:  aspirin enteric coated 81 milliGRAM(s) Oral daily  atorvastatin 40 milliGRAM(s) Oral at bedtime  dextrose 40% Gel 15 Gram(s) Oral once  dextrose 50% Injectable 25 Gram(s) IV Push once  dextrose 50% Injectable 12.5 Gram(s) IV Push once  dextrose 50% Injectable 25 Gram(s) IV Push once  finasteride 5 milliGRAM(s) Oral daily  influenza   Vaccine 0.5 milliLiter(s) IntraMuscular once  insulin lispro (ADMELOG) corrective regimen sliding scale   SubCutaneous three times a day before meals  insulin lispro (ADMELOG) corrective regimen sliding scale   SubCutaneous at bedtime  isosorbide   mononitrate ER Tablet (IMDUR) 15 milliGRAM(s) Oral daily  melatonin 5 milliGRAM(s) Oral at bedtime PRN  metoprolol succinate ER 50 milliGRAM(s) Oral daily  ranolazine 500 milliGRAM(s) Oral two times a day  tamsulosin 0.8 milliGRAM(s) Oral at bedtime  ticagrelor 90 milliGRAM(s) Oral every 12 hours      Vitals:  T(F): 98.1 (11-24), Max: 98.3 (11-23)  HR: 64 (11-24) (56 - 75)  BP: 110/64 (11-24) (93/44 - 150/82)  RR: 18 (11-24)  SpO2: 95% (11-24)  I&O's Summary    2020 07:01  -  2020 07:00  --------------------------------------------------------  IN: 470 mL / OUT: 500 mL / NET: -30 mL      Physical Exam:  Appearance: No acute distress; well appearing  Eyes: EOMI, pink conjunctiva  HEENT: Normal oral mucosa  Cardiovascular: RRR, S1, S2, no murmurs, rubs, or gallops; no edema; right radial access site  Respiratory: Clear to auscultation bilaterally  Gastrointestinal: soft, non-tender  Musculoskeletal: No clubbing; no joint deformity   Neurologic: Non-focal  Psychiatry: AAOx3, mood & affect appropriate  Skin: No rashes            PT/INR - ( 2020 06:06 )   PT: 13.3 sec;   INR: 1.11 ratio         PTT - ( 2020 06:06 )  PTT:34.9 sec  CARDIAC MARKERS ( 2020 08:44 )  x     / 2.850 ng/mL / x     / 245 U/L / x     / 16.8 ng/mL  CARDIAC MARKERS ( 2020 03:01 )  x     / 1.610 ng/mL / x     / 188 U/L / x     / 12.7 ng/mL  CARDIAC MARKERS ( 2020 21:48 )  x     / <.015 ng/mL / x     / x     / x     / x          Serum Pro-Brain Natriuretic Peptide: 330 pg/mL ( @ 21:48)          Echo: None       Cath: < from: Cardiac Cath Lab - Adult (20 @ 15:06) >  Case Physician(s):  AKI Piper M.D.  Fellow:  AKI Hudson M.D.  Referring Physician:  Ramos Quintero M.D.  INDICATIONS: Initial NSTEMI.  HISTORY: There was no prior cardiac history. The patient has hypertension,  medication-treated dyslipidemia, and a family history of coronary artery  disease.  PROCEDURE:  --  Left heart catheterization.  --  Left coronary angiography.  --  Right coronary angiography.  --  Intervention on mid RCA: drug-eluting stent, balloon angioplasty.  TECHNIQUE: The risks and alternatives of the procedures and conscious  sedation were explained to the patient and informed consent was obtained.  Cardiac catheterization performed electively.  Local anesthetic given. Right radial artery access. A 6FR PRELUDE KIT was  inserted in the vessel. Left heart catheterization. Left coronary artery  angiography. The vessel was injected utilizing a catheter. Right coronary  artery angiography. The vessel was injected utilizing a catheter.  RADIATION EXPOSURE: 18.6 min. A successful drug-eluting stent with balloon  angioplasty was performed on the 95 % lesion in the mid RCA. Following  intervention there was an excellent angiographicappearance with a 1 %  residual stenosis. This was a bifurcation lesion. According to the ACC/AHA  classification system, this lesion was a type C lesion. There was no  evidence of the transient no-reflow phenomenon. There was THEA 3 flow  before theprocedure and THEA 3 flow after the procedure. There was no  dissection. Vessel setup was performed. A 6FR JR4 LAUNCHER guiding  catheter was used to intubate the vessel. Vessel setup was performed. A  BMW UNIVERSAL 190CM wire was used to cross the lesion. Balloon angioplasty  was performed, using a 2.50 X 20 APEX balloon, with 8 inflations and a  maximum inflation pressure of 14 seymour. A 2.50 X 38 CHARLENE drug-eluting stent  was placed across the lesion and deployed at a maximum inflation pressure  of 14 seymour. Balloon angioplasty was performed, using a 2.75 X 20 EUPHORA NC  balloon, with 3 inflations and a maximum inflation pressure of 16 seymour.  CONTRAST GIVEN: Omnipaque 12 ml. Omnipaque 45 ml.  MEDICATIONS GIVEN: Midazolam, 0.5 mg, IV. Fentanyl, 25 mcg, IV. Midazolam,  1 mg, IV. Fentanyl, 25 mcg, IV. Heparin, 3000 units, IV. Heparin, 5000  units, IV. Cardene, 500 mcg.  VENTRICLES: No LV gram was performed; however, a recent echocardiogram  demonstrated an EF of 49 %.  CORONARY VESSELS: The coronary circulation is right dominant.  LM:   --  LM: Normal.  LAD:   --  Mid LAD: There was a 50 % stenosis.  CX:   --  Proximal circumflex: There was a 80 % stenosis.  --  Distal circumflex: There was a 80 % stenosis.  RCA:   --  Mid RCA: There was a diffuse 95 % stenosis. There was THEA grade  2 flow through the vessel (partial perfusion).  COMPLICATIONS: There were no complications.  DIAGNOSTIC RECOMMENDATIONS:  1. Successful PCI to the mRCA (3.0mm Charlene FLORENCE) and POBA to the RPDA and RPL  (2.5mm balloons) in the setting of angina/NSTEMI  2. DAPT  3. Given the nature of the disease will consider PCI of the LCx  INTERVENTIONAL RECOMMENDATIONS:  1. Successful PCI to the mRCA (3.0mm [place] FLORENCE) and POBA to the RPDA and RPL  (2.5mm balloons) in the setting of angina/NSTEMI  2. DAPT  3. Given the nature of the disease will consider PCI of the LCx  Prepared and signed by  Christie Knight M.D.  Signed 2020 15:29:55  HEMODYNAMIC TABLES  Pressures:  Baseline  Pressures:  - HR: 64  Pressures:  - Rhythm:  Pressures:  -- Aortic Pressure (S/D/M): 128/62/87  Pressures:  -- Left Ventricle (s/edp): 131/15/--  Outputs:  Baseline  Outputs:  -- CALCULATIONS[age]  Outputs:  -- CALCULATIONS: Body Surface Area: 2.00  Outputs:  -- CALCULATIONS: Height in cm: 178.00  Outputs:  -- CALCULATIONS: Sex: Male  Outputs:  -- CALCULATIONS: Weight in k.60  Outputs:  -- OUTPUTS: O2 consumption: 249.63  Outputs:  -- OUTPUTS: Vo2 Indexed: 125.00    < end of copied text >

## 2020-11-24 NOTE — PHYSICAL THERAPY INITIAL EVALUATION ADULT - IMPAIRED TRANSFERS: SIT/STAND, REHAB EVAL
impaired postural control/impaired balance/decreased strength/sit-stand at RW x 3 trials independent ; w/o AD close supervision mild unsteady

## 2020-11-24 NOTE — PROGRESS NOTE ADULT - ASSESSMENT
80 year old male with PMH of hypertension, hyperlipidemia, diabetes mellitus type 2, NSTEMI ([facility], [facility] [place] [facility]), NOEL on BIPAP, BPH, p/w NSTEMI S/P LHC with DESx1 RCA and staged PCI for LCx 11/23.    CAD   S/P LHC with DESx1 RCA and  staged PCI for LCx 11/23  Cont Aspirin/ Brilinta/ Lipitor  Cont Toprol 50 mg QD as tolerated     Neto Her MD  Cardiology Fellow  [phone]    Please check [email or web address] for cardiology service schedule and contact information.

## 2020-11-24 NOTE — PHYSICAL THERAPY INITIAL EVALUATION ADULT - IMPAIRMENTS CONTRIBUTING TO GAIT DEVIATIONS, PT EVAL
impaired postural control/see gait comments above ; pt understood to use RW to amb safely independent , amb w/ std cane or w/o AD with assist/impaired balance/decreased strength

## 2020-11-24 NOTE — PROGRESS NOTE ADULT - PROBLEM SELECTOR PLAN 1
#History of NSTEMI, angina  #ACS  Presented at [facility] with chest pain 11/19, transferred for C  - S/P LHC mid RCA 90% s/p FLORENCE x1; Cx mid 90%  - Continue to monitor on telemetry  - Continue ASA 81mg po daily + Brilinta 90mg po q12h  - Continue metoprolol succinate 50mg PO daily  - Continue home medications Imdur, Ranolazine  - Monitor BMP, electrolytes  - F/U cardiology for further recommendations  - now s/p staged PCI for Cx mid 90% on 11/23  - Cr stable, will add lisinopril 5mg daily today  - f/u TTE - pending read #History of NSTEMI, angina  #ACS  Presented at [facility] with chest pain 11/19, transferred for C  - S/P LHC mid RCA 90% s/p FLORENCE x1; Cx mid 90%  - Continue to monitor on telemetry  - Continue ASA 81mg po daily + Brilinta 90mg po q12h  - Continue metoprolol succinate 50mg PO daily  - Continue home medications Imdur, Ranolazine  - Monitor BMP, electrolytes  - F/U cardiology for further recommendations  - now s/p staged PCI for Cx mid 90% on 11/23  - Cr stable, will add lisinopril 5mg daily today  - TTE results noted as above.

## 2020-11-24 NOTE — PROGRESS NOTE ADULT - ATTENDING COMMENTS
Adriana Jason MD  Division of Hospital Medicine  [facility]   Pager: [phone]    pending PT eval and TTE read.  lisinopril introduced today.  will need to follow-up with [facility] PCP and Cardiologist upon discharge  if no PT needs, anticipate discharge tomorrow 11/25. Adriana Jason MD  Division of Hospital Medicine  [facility]   Pager: [phone]    lisinopril introduced today as Cr has remained stable.  will need to follow-up with [facility] PCP Dr. Varela Cardiologist upon discharge, appointments made for 11/30/20  PT recommending home PT with home assist    medically stable for discharge to home with follow-up as above  discharge planning time spent: 42 minutes Adriana Jason MD  Division of Hospital Medicine  [facility]   Pager: [phone]    lisinopril introduced today as Cr has remained stable.  will need to follow-up with [facility] PCP Dr. Varela Cardiologist upon discharge, appointments made for 11/30/20  PT recommending home PT with home assist    medically stable for discharge to home with follow-up as above  discharge planning time spent: 42 minutes    I spoke with his PCP Dr. Alysia Graham via phone and updated regarding hospital course. will fax over cath report and TTE results.

## 2020-11-24 NOTE — PHYSICAL THERAPY INITIAL EVALUATION ADULT - PRECAUTIONS/LIMITATIONS, REHAB EVAL
fall precautions/80 year old male with PMH of hypertension, hyperlipidemia, diabetes mellitus type 2, NSTEMI (2017, [facility] [place] [facility]), NOEL on BIPAP, BPH, initially presented to [facility] with complaint of chest pain; transferred to [facility] for further management. S/P LHC, FLORENCE x1 RCA.  Per pervious notes in chart, patient reportedly started experiencing mid sternal chest pain after carrying grocery bags, not relieved with NTG SL x2. At [facility], Initial ECG showed 1mm ST depressions in leads V4-6 and aVF, repeat ECG showed 2mm ST depressions in leads V4-5, received , famotidine, Troponin 2.85<-1.61, BUN/Cr 27/1.4<-23/1.2, transferred for LHC s/p DESx1 RCA and plan for staged PCI for LCx 11/23.

## 2020-11-24 NOTE — DISCHARGE NOTE PROVIDER - NSDCCPCAREPLAN_GEN_ALL_CORE_FT
PRINCIPAL DISCHARGE DIAGNOSIS  Diagnosis: Non-ST elevation MI (NSTEMI)  Assessment and Plan of Treatment: Call your doctor if you have unusual chest pain, pressure, or discomfort, shortness of breath, nausea, vomiting, burping, heartburn, tingling upper body parts, sweating, cold, clammy sking, racing heartbeat  Call 911 if you think you are having a heart attack  Take all cardiac medications as prescribed - notify your doctor if you have any side effects  Follow cardiac diet - avoid fatty & fried foods, don't eat too much red meat, eat lots of fruits & vegetables, dairy products should be low fat  Lose weight if you are overweight  Become more active with walking, gardening, or any other activity that gets you to move  Follow with Cardiologist at planned        SECONDARY DISCHARGE DIAGNOSES  Diagnosis: Diabetes mellitus  Assessment and Plan of Treatment: Diabetes mellitus  HgA1C this admission.  Make sure you get your HgA1c checked every three months.  If you take oral diabetes medications, check your blood glucose two times a day.  If you take insulin, check your blood glucose before meals and at bedtime.  It's important not to skip any meals.  Keep a log of your blood glucose results and always take it with you to your doctor appointments.  Keep a list of your current medications including injectables and over the counter medications and bring this medication list with you to all your doctor appointments.  If you have not seen your ophthalmologist this year call for appointment.  Check your feet daily for redness, sores, or openings. Do not self treat. If no improvement in two days call your primary care physician for an appointment.  Low blood sugar (hypoglycemia) is a blood sugar below 70mg/dl. Check your blood sugar if you feel signs/symptoms of hypoglycemia. If your blood sugar is below 70 take 15 grams of carbohydrates (ex 4 oz of apple juice, 3-4 glucose tablets, or 4-6 oz of regular soda) wait 15 minutes and repeat blood sugar to make sure it comes up above 70.  If your blood sugar is above 70 and you are due for a meal, have a meal.  If you are not due for a meal have a snack.  This snack helps keeps your blood sugar at a safe range.      Diagnosis: BPH (benign prostatic hyperplasia)  Assessment and Plan of Treatment: BPH (benign prostatic hyperplasia)!pbph  Continue Finesteride 5 mg daily and Tamsulosin 0.4 mg daily      Diagnosis: Hypertension  Assessment and Plan of Treatment: Hypertension  Continue Toprol  Follow up with your medical doctor to establish long term blood pressure treatment goals.      Diagnosis: Hyperlipidemia  Assessment and Plan of Treatment: Hyperlipidemia  Continue Lipitor and dietary modifications

## 2020-11-24 NOTE — PHYSICAL THERAPY INITIAL EVALUATION ADULT - GAIT DISTANCE, PT EVAL
amb with RW 75 ft steady gait independent  ; amb with std cane 50 ft intermittent min unsteady cg of 1 ; w/o AD cg of1 min unsteady 25 ft

## 2020-11-24 NOTE — PHYSICAL THERAPY INITIAL EVALUATION ADULT - PERTINENT HX OF CURRENT PROBLEM, REHAB EVAL
recent  ; CXR grossly clear; COVID19 (not detected) 11/19/20 antibody (-) 11/21/20 ; s/p CAth 11/20/20 Mid LAD stenosis 50 %/prox and distal Cx 80 % /mid RCA 95 % staged stents

## 2020-11-24 NOTE — PROGRESS NOTE ADULT - ASSESSMENT
80 year old male with PMH of hypertension, hyperlipidemia, diabetes mellitus type 2, NSTEMI (2017, [facility] [place] [facility]), NOEL on BIPAP, BPH, initially presented to [facility] with complaint of chest pain; transferred to [facility] for further management. S/P LHC, FLORENCE x1 RCA.  Per pervious notes in chart, patient reportedly started experiencing mid sternal chest pain after carrying grocery bags, not relieved with NTG SL x2. At [facility], Initial ECG showed 1mm ST depressions in leads V4-6 and aVF, repeat ECG showed 2mm ST depressions in leads V4-5, received , famotidine, Troponin 2.85<-1.61, BUN/Cr 27/1.4<-23/1.2, transferred for LHC s/p DESx1 RCA and plan for staged PCI for LCx 11/23.    CAD s/p PCI  - s/p pci of rca and lcx  - c/w ASA and ticagrelor  - c/w statin  - c/w imdur  - bb held for sinus bradycardia  - if Cr normal, will eventually start ace post procedure-no labs today  - c/w ranexa, though now that he is revascularized we can consider dc this   - await TTE  - [facility] and will need to make an appt there for the next 1-2w    HTN  - well controlled   - c/w current meds    HLD  - c/w statin, goal LDL <70    - Monitor and replete lytes, keep K>4 and Mg >2  - Further cardiac workup will depend on clinical course.   - All other workup and dc planning per primary team  - Will continue to follow

## 2020-11-30 ENCOUNTER — NON-APPOINTMENT (OUTPATIENT)
Age: 80
End: 2020-11-30

## 2020-11-30 ENCOUNTER — APPOINTMENT (OUTPATIENT)
Dept: CARDIOLOGY | Facility: CLINIC | Age: 80
End: 2020-11-30
Payer: MEDICARE

## 2020-11-30 ENCOUNTER — RESULT CHARGE (OUTPATIENT)
Age: 80
End: 2020-11-30

## 2020-11-30 VITALS
HEART RATE: 73 BPM | BODY MASS INDEX: 27.43 KG/M2 | DIASTOLIC BLOOD PRESSURE: 85 MMHG | WEIGHT: 181 LBS | HEIGHT: 68 IN | OXYGEN SATURATION: 98 % | SYSTOLIC BLOOD PRESSURE: 173 MMHG

## 2020-11-30 DIAGNOSIS — Z86.39 PERSONAL HISTORY OF OTHER ENDOCRINE, NUTRITIONAL AND METABOLIC DISEASE: ICD-10-CM

## 2020-11-30 DIAGNOSIS — Z86.79 PERSONAL HISTORY OF OTHER DISEASES OF THE CIRCULATORY SYSTEM: ICD-10-CM

## 2020-11-30 DIAGNOSIS — Z87.438 PERSONAL HISTORY OF OTHER DISEASES OF MALE GENITAL ORGANS: ICD-10-CM

## 2020-11-30 DIAGNOSIS — Z86.69 PERSONAL HISTORY OF OTHER DISEASES OF THE NERVOUS SYSTEM AND SENSE ORGANS: ICD-10-CM

## 2020-11-30 DIAGNOSIS — N17.9 ACUTE KIDNEY FAILURE, UNSPECIFIED: ICD-10-CM

## 2020-11-30 DIAGNOSIS — I24.9 ACUTE ISCHEMIC HEART DISEASE, UNSPECIFIED: ICD-10-CM

## 2020-11-30 PROCEDURE — 93000 ELECTROCARDIOGRAM COMPLETE: CPT

## 2020-11-30 PROCEDURE — 99215 OFFICE O/P EST HI 40 MIN: CPT

## 2020-11-30 RX ORDER — KRILL/OM-3/DHA/EPA/PHOSPHO/AST 1000-230MG
81 CAPSULE ORAL
Refills: 0 | Status: ACTIVE | COMMUNITY
Start: 2020-11-30

## 2020-11-30 RX ORDER — METFORMIN HYDROCHLORIDE 500 MG/1
500 TABLET, COATED ORAL
Refills: 0 | Status: ACTIVE | COMMUNITY

## 2020-11-30 RX ORDER — ISOSORBIDE MONONITRATE 30 MG
30 TABLET, EXTENDED RELEASE 24 HR ORAL
Refills: 0 | Status: ACTIVE | COMMUNITY

## 2020-11-30 RX ORDER — TAMSULOSIN HYDROCHLORIDE 0.4 MG/1
0.4 CAPSULE ORAL
Refills: 0 | Status: ACTIVE | COMMUNITY

## 2020-11-30 RX ORDER — ATORVASTATIN CALCIUM 40 MG/1
40 TABLET, FILM COATED ORAL
Refills: 0 | Status: ACTIVE | COMMUNITY

## 2020-11-30 RX ORDER — FINASTERIDE 5 MG/1
5 TABLET, FILM COATED ORAL
Refills: 0 | Status: ACTIVE | COMMUNITY

## 2020-11-30 RX ORDER — LISINOPRIL 5 MG/1
5 TABLET ORAL
Refills: 0 | Status: ACTIVE | COMMUNITY

## 2020-11-30 RX ORDER — TICAGRELOR 90 MG/1
90 TABLET ORAL
Refills: 0 | Status: ACTIVE | COMMUNITY

## 2020-11-30 RX ORDER — RANOLAZINE 500 MG/1
500 TABLET, EXTENDED RELEASE ORAL
Refills: 0 | Status: ACTIVE | COMMUNITY

## 2020-11-30 RX ORDER — METOPROLOL SUCCINATE 50 MG/1
50 TABLET, EXTENDED RELEASE ORAL
Refills: 0 | Status: ACTIVE | COMMUNITY

## 2020-12-01 NOTE — REVIEW OF SYSTEMS
[Shortness Of Breath] : no shortness of breath [Dyspnea on exertion] : not dyspnea during exertion [Chest Pain] : no chest pain [Lower Ext Edema] : no extremity edema [Palpitations] : no palpitations [Cough] : no cough [Wheezing] : no wheezing [Abdominal Pain] : no abdominal pain [Heartburn] : no heartburn [Dysphagia] : no dysphagia [Negative] : Heme/Lymph

## 2020-12-01 NOTE — DISCUSSION/SUMMARY
[FreeTextEntry1] : Iggy appears well with no evidence of active ischemia, heart failure, or dysrhythmias. We discussed his coronary artery disease at length and is normal left ventricular function by echo. He'll continue his current medications and we will check a lipid profile in several months. Counseling on his myocardial infarction, coronary artery disease, and cardiovascular risk represented greater than 50% of his visit which was 45 minutes in duration. Further management can be dependent on his clinical course

## 2020-12-01 NOTE — PHYSICAL EXAM
[General Appearance - Well Developed] : well developed [Normal Appearance] : normal appearance [Well Groomed] : well groomed [General Appearance - Well Nourished] : well nourished [No Deformities] : no deformities [General Appearance - In No Acute Distress] : no acute distress [Normal Conjunctiva] : the conjunctiva exhibited no abnormalities [Eyelids - No Xanthelasma] : the eyelids demonstrated no xanthelasmas [Normal Oral Mucosa] : normal oral mucosa [No Oral Pallor] : no oral pallor [No Oral Cyanosis] : no oral cyanosis [Normal Jugular Venous A Waves Present] : normal jugular venous A waves present [Normal Jugular Venous V Waves Present] : normal jugular venous V waves present [No Jugular Venous Adams A Waves] : no jugular venous adams A waves [5th Left ICS - MCL] : palpated at the 5th LICS in the midclavicular line [Normal] : normal [No Precordial Heave] : no precordial heave was noted [Apical Thrill] : no thrill palpable at the apex [Normal Rate] : normal [Heart Rate ___] : [unfilled] bpm [Rhythm Regular] : regular [Normal S1] : normal S1 [Normal S2] : normal S2 [No Gallop] : no gallop heard [S3] : no S3 [S4] : no S4 [Click] : no click [Pericardial Rub] : no pericardial rub [No Murmur] : no murmurs heard [Right Carotid Bruit] : no bruit heard over the right carotid [Left Carotid Bruit] : no bruit heard over the left carotid [Right Femoral Bruit] : no bruit heard over the right femoral artery [Left Femoral Bruit] : no bruit heard over the left femoral artery [2+] : left 2+ [No Abnormalities] : the abdominal aorta was not enlarged and no bruit was heard [Bruit] : no bruit heard [No Pitting Edema] : no pitting edema present [Rt] : no varicose veins of the right leg [Lt] : no varicose veins of the left leg [Respiration, Rhythm And Depth] : normal respiratory rhythm and effort [Exaggerated Use Of Accessory Muscles For Inspiration] : no accessory muscle use [Auscultation Breath Sounds / Voice Sounds] : lungs were clear to auscultation bilaterally [Abdomen Soft] : soft [Abdomen Tenderness] : non-tender [Abdomen Mass (___ Cm)] : no abdominal mass palpated [Abnormal Walk] : normal gait [Gait - Sufficient For Exercise Testing] : the gait was sufficient for exercise testing [Nail Clubbing] : no clubbing of the fingernails [Cyanosis, Localized] : no localized cyanosis [Petechial Hemorrhages (___cm)] : no petechial hemorrhages [Skin Color & Pigmentation] : normal skin color and pigmentation [] : no rash [No Venous Stasis] : no venous stasis [Skin Lesions] : no skin lesions [No Skin Ulcers] : no skin ulcer [No Xanthoma] : no  xanthoma was observed [Oriented To Time, Place, And Person] : oriented to person, place, and time [Affect] : the affect was normal [Mood] : the mood was normal [No Anxiety] : not feeling anxious

## 2020-12-01 NOTE — HISTORY OF PRESENT ILLNESS
[FreeTextEntry1] : Iggy presents for evaluation of coronary artery disease. He experienced a non-ST segment elevation myocardial infarction in 2017. In November of 2020, he again developed typical anginal chest pressure and was found to have a non-ST segment elevation myocardial infarction. He substernally underwent angioplasty with stenting of the right coronary artery and balloon angioplasty of the PDA and posterior lateral branch. He had a 50% stenosis of the LAD. He is now feeling better with no chest pain or dyspnea\par \par Past medical history is unremarkable for hyperlipidemia, coronary artery disease, diabetes, obstructive sleep apnea, hypertension, benign prostatic hypertrophy, idiopathic left hemidiaphragm elevation\par \par Social history is negative for alcohol or tobacco use

## 2020-12-03 ENCOUNTER — APPOINTMENT (OUTPATIENT)
Dept: CARDIOLOGY | Facility: CLINIC | Age: 80
End: 2020-12-03

## 2020-12-21 RX ORDER — TICAGRELOR 90 MG/1
1 TABLET ORAL
Qty: 180 | Refills: 3
Start: 2020-12-21 | End: 2021-12-15

## 2021-01-11 ENCOUNTER — NON-APPOINTMENT (OUTPATIENT)
Age: 81
End: 2021-01-11

## 2021-01-11 ENCOUNTER — APPOINTMENT (OUTPATIENT)
Dept: CARDIOLOGY | Facility: CLINIC | Age: 81
End: 2021-01-11
Payer: MEDICARE

## 2021-01-11 VITALS
HEART RATE: 75 BPM | DIASTOLIC BLOOD PRESSURE: 95 MMHG | WEIGHT: 180 LBS | HEIGHT: 68 IN | BODY MASS INDEX: 27.28 KG/M2 | SYSTOLIC BLOOD PRESSURE: 195 MMHG

## 2021-01-11 VITALS — SYSTOLIC BLOOD PRESSURE: 140 MMHG | DIASTOLIC BLOOD PRESSURE: 80 MMHG

## 2021-01-11 PROCEDURE — 99214 OFFICE O/P EST MOD 30 MIN: CPT

## 2021-01-11 PROCEDURE — 93000 ELECTROCARDIOGRAM COMPLETE: CPT

## 2021-03-19 ENCOUNTER — APPOINTMENT (OUTPATIENT)
Dept: CARDIOLOGY | Facility: CLINIC | Age: 81
End: 2021-03-19

## 2021-05-06 ENCOUNTER — NON-APPOINTMENT (OUTPATIENT)
Age: 81
End: 2021-05-06

## 2021-05-06 ENCOUNTER — APPOINTMENT (OUTPATIENT)
Dept: CARDIOLOGY | Facility: CLINIC | Age: 81
End: 2021-05-06
Payer: MEDICARE

## 2021-05-06 VITALS
OXYGEN SATURATION: 97 % | HEART RATE: 77 BPM | SYSTOLIC BLOOD PRESSURE: 150 MMHG | HEIGHT: 68 IN | WEIGHT: 182 LBS | BODY MASS INDEX: 27.58 KG/M2 | DIASTOLIC BLOOD PRESSURE: 76 MMHG

## 2021-05-06 DIAGNOSIS — I25.10 ATHEROSCLEROTIC HEART DISEASE OF NATIVE CORONARY ARTERY W/OUT ANGINA PECTORIS: ICD-10-CM

## 2021-05-06 PROCEDURE — 99214 OFFICE O/P EST MOD 30 MIN: CPT

## 2021-05-06 PROCEDURE — 93000 ELECTROCARDIOGRAM COMPLETE: CPT

## 2021-05-14 ENCOUNTER — NON-APPOINTMENT (OUTPATIENT)
Age: 81
End: 2021-05-14

## 2022-02-01 ENCOUNTER — APPOINTMENT (OUTPATIENT)
Dept: CARDIOLOGY | Facility: CLINIC | Age: 82
End: 2022-02-01

## 2024-04-10 NOTE — ED ADULT NURSE NOTE - NSFALLRSKASSESSTYPE_ED_ALL_ED
Mildly to Moderately Impaired: difficulty hearing in some environments or speaker may need to increase volume or speak distinctly Initial (On Arrival)

## 2024-04-17 NOTE — PHYSICAL THERAPY INITIAL EVALUATION ADULT - GROSSLY INTACT, SENSORY
I spoke with pts. wife and scheduled an appt. for 07/03 with Dr. Kidd.    pt report intermittent tingling finger tips comes and goes currently w/o/Grossly Intact